# Patient Record
Sex: FEMALE | Race: BLACK OR AFRICAN AMERICAN | NOT HISPANIC OR LATINO | URBAN - METROPOLITAN AREA
[De-identification: names, ages, dates, MRNs, and addresses within clinical notes are randomized per-mention and may not be internally consistent; named-entity substitution may affect disease eponyms.]

---

## 2023-01-01 ENCOUNTER — APPOINTMENT (OUTPATIENT)
Dept: RADIOLOGY | Facility: HOSPITAL | Age: 88
DRG: 070 | End: 2023-01-01
Payer: OTHER MISCELLANEOUS

## 2023-01-01 ENCOUNTER — HOSPITAL ENCOUNTER (INPATIENT)
Facility: HOSPITAL | Age: 88
LOS: 1 days | Discharge: HOSPICE | DRG: 070 | End: 2023-10-21
Attending: EMERGENCY MEDICINE | Admitting: INTERNAL MEDICINE
Payer: OTHER MISCELLANEOUS

## 2023-01-01 ENCOUNTER — LAB (OUTPATIENT)
Dept: LAB | Facility: LAB | Age: 88
End: 2023-01-01
Payer: MEDICARE

## 2023-01-01 ENCOUNTER — APPOINTMENT (OUTPATIENT)
Dept: CARDIOLOGY | Facility: HOSPITAL | Age: 88
DRG: 070 | End: 2023-01-01
Payer: OTHER MISCELLANEOUS

## 2023-01-01 ENCOUNTER — OFFICE VISIT (OUTPATIENT)
Dept: WOUND CARE | Facility: CLINIC | Age: 88
End: 2023-01-01
Payer: MEDICARE

## 2023-01-01 ENCOUNTER — OFFICE VISIT (OUTPATIENT)
Dept: PRIMARY CARE | Facility: CLINIC | Age: 88
End: 2023-01-01
Payer: MEDICARE

## 2023-01-01 ENCOUNTER — LAB REQUISITION (OUTPATIENT)
Dept: LAB | Facility: HOSPITAL | Age: 88
End: 2023-01-01
Payer: MEDICARE

## 2023-01-01 ENCOUNTER — APPOINTMENT (OUTPATIENT)
Dept: CARDIOLOGY | Facility: CLINIC | Age: 88
End: 2023-01-01
Payer: MEDICARE

## 2023-01-01 VITALS
HEART RATE: 60 BPM | OXYGEN SATURATION: 97 % | BODY MASS INDEX: 19.08 KG/M2 | RESPIRATION RATE: 18 BRPM | HEIGHT: 64 IN | WEIGHT: 111.77 LBS | TEMPERATURE: 95.9 F | SYSTOLIC BLOOD PRESSURE: 126 MMHG | DIASTOLIC BLOOD PRESSURE: 84 MMHG

## 2023-01-01 VITALS — TEMPERATURE: 97.1 F | HEART RATE: 88 BPM

## 2023-01-01 DIAGNOSIS — F01.C18 SEVERE VASCULAR DEMENTIA WITH OTHER BEHAVIORAL DISTURBANCE (MULTI): ICD-10-CM

## 2023-01-01 DIAGNOSIS — E87.6 HYPOKALEMIA: ICD-10-CM

## 2023-01-01 DIAGNOSIS — T14.8XXA CHRONIC WOUND: ICD-10-CM

## 2023-01-01 DIAGNOSIS — E78.49 OTHER HYPERLIPIDEMIA: ICD-10-CM

## 2023-01-01 DIAGNOSIS — L89.152 PRESSURE ULCER OF SACRAL REGION, STAGE 2 (MULTI): ICD-10-CM

## 2023-01-01 DIAGNOSIS — E55.9 VITAMIN D DEFICIENCY: ICD-10-CM

## 2023-01-01 DIAGNOSIS — F32.A DEPRESSIVE DISORDER: ICD-10-CM

## 2023-01-01 DIAGNOSIS — Z28.21 INFLUENZA VACCINE REFUSED: ICD-10-CM

## 2023-01-01 DIAGNOSIS — R79.89 ELEVATED TROPONIN: ICD-10-CM

## 2023-01-01 DIAGNOSIS — Z71.85 VACCINE COUNSELING: ICD-10-CM

## 2023-01-01 DIAGNOSIS — R41.82 ALTERED MENTAL STATUS, UNSPECIFIED ALTERED MENTAL STATUS TYPE: Primary | ICD-10-CM

## 2023-01-01 DIAGNOSIS — E87.0 HYPERNATREMIA: ICD-10-CM

## 2023-01-01 DIAGNOSIS — R41.89 COGNITIVE IMPAIRMENT: ICD-10-CM

## 2023-01-01 DIAGNOSIS — I50.32 CHRONIC DIASTOLIC HEART FAILURE (MULTI): ICD-10-CM

## 2023-01-01 DIAGNOSIS — Z51.5 HOSPICE CARE: ICD-10-CM

## 2023-01-01 DIAGNOSIS — R41.89 UNRESPONSIVE STATE: ICD-10-CM

## 2023-01-01 DIAGNOSIS — I95.1 ORTHOSTATIC HYPOTENSION: ICD-10-CM

## 2023-01-01 DIAGNOSIS — E87.6 HYPOKALEMIA: Primary | ICD-10-CM

## 2023-01-01 DIAGNOSIS — I95.1 ORTHOSTATIC HYPOTENSION: Primary | ICD-10-CM

## 2023-01-01 DIAGNOSIS — L97.512 NON-PRESSURE CHRONIC ULCER OF OTHER PART OF RIGHT FOOT WITH FAT LAYER EXPOSED (MULTI): ICD-10-CM

## 2023-01-01 DIAGNOSIS — R57.9 SHOCK (MULTI): ICD-10-CM

## 2023-01-01 DIAGNOSIS — Z76.0 ENCOUNTER FOR MEDICATION REFILL: ICD-10-CM

## 2023-01-01 LAB
25(OH)D3 SERPL-MCNC: 43 NG/ML (ref 30–100)
ALANINE AMINOTRANSFERASE (SGPT) (U/L) IN SER/PLAS: 10 U/L (ref 7–45)
ALBUMIN (G/DL) IN SER/PLAS: 3.2 G/DL (ref 3.4–5)
ALBUMIN SERPL BCP-MCNC: 2.4 G/DL (ref 3.4–5)
ALBUMIN SERPL BCP-MCNC: 2.6 G/DL (ref 3.4–5)
ALBUMIN SERPL BCP-MCNC: 2.8 G/DL (ref 3.4–5)
ALKALINE PHOSPHATASE (U/L) IN SER/PLAS: 48 U/L (ref 33–136)
ALP SERPL-CCNC: 40 U/L (ref 33–136)
ALP SERPL-CCNC: 50 U/L (ref 33–136)
ALP SERPL-CCNC: 58 U/L (ref 33–136)
ALT SERPL W P-5'-P-CCNC: 44 U/L (ref 7–45)
ALT SERPL W P-5'-P-CCNC: 7 U/L (ref 7–45)
ALT SERPL W P-5'-P-CCNC: 9 U/L (ref 7–45)
ANION GAP BLDA CALCULATED.4IONS-SCNC: 3 MMO/L (ref 10–25)
ANION GAP BLDA CALCULATED.4IONS-SCNC: 4 MMO/L (ref 10–25)
ANION GAP BLDV CALCULATED.4IONS-SCNC: 3 MMOL/L (ref 10–25)
ANION GAP IN SER/PLAS: 9 MMOL/L (ref 10–20)
ANION GAP SERPL CALC-SCNC: 10 MMOL/L (ref 10–20)
ANION GAP SERPL CALC-SCNC: 11 MMOL/L (ref 10–20)
ANION GAP SERPL CALC-SCNC: 11 MMOL/L (ref 10–20)
APPARATUS: ABNORMAL
APPEARANCE UR: CLEAR
ASPARTATE AMINOTRANSFERASE (SGOT) (U/L) IN SER/PLAS: 21 U/L (ref 9–39)
AST SERPL W P-5'-P-CCNC: 17 U/L (ref 9–39)
AST SERPL W P-5'-P-CCNC: 18 U/L (ref 9–39)
AST SERPL W P-5'-P-CCNC: 54 U/L (ref 9–39)
ATRIAL RATE: 104 BPM
BACTERIA BLD CULT: NORMAL
BACTERIA BLD CULT: NORMAL
BACTERIA SPEC CULT: ABNORMAL
BACTERIA SPEC CULT: NORMAL
BASE EXCESS BLDA CALC-SCNC: 6.6 MMOL/L (ref -2–3)
BASE EXCESS BLDA CALC-SCNC: 8.7 MMOL/L (ref -2–3)
BASE EXCESS BLDV CALC-SCNC: 10.5 MMOL/L (ref -2–3)
BASOPHILS # BLD AUTO: 0.01 X10*3/UL (ref 0–0.1)
BASOPHILS # BLD AUTO: 0.02 X10*3/UL (ref 0–0.1)
BASOPHILS (10*3/UL) IN BLOOD BY AUTOMATED COUNT: 0.02 X10E9/L (ref 0–0.1)
BASOPHILS NFR BLD AUTO: 0.1 %
BASOPHILS NFR BLD AUTO: 0.3 %
BASOPHILS/100 LEUKOCYTES IN BLOOD BY AUTOMATED COUNT: 0.5 % (ref 0–2)
BILIRUB SERPL-MCNC: 0.3 MG/DL (ref 0–1.2)
BILIRUB SERPL-MCNC: 0.3 MG/DL (ref 0–1.2)
BILIRUB SERPL-MCNC: 0.4 MG/DL (ref 0–1.2)
BILIRUB UR STRIP.AUTO-MCNC: NEGATIVE MG/DL
BILIRUBIN TOTAL (MG/DL) IN SER/PLAS: 0.5 MG/DL (ref 0–1.2)
BODY TEMPERATURE: ABNORMAL
BUN SERPL-MCNC: 18 MG/DL (ref 6–23)
BUN SERPL-MCNC: 21 MG/DL (ref 6–23)
BUN SERPL-MCNC: 25 MG/DL (ref 6–23)
CA-I BLDA-SCNC: 1.03 MMOL/L (ref 1.1–1.33)
CA-I BLDA-SCNC: 1.04 MMOL/L (ref 1.1–1.33)
CA-I BLDV-SCNC: 0.99 MMOL/L (ref 1.1–1.33)
CALCIUM (MG/DL) IN SER/PLAS: 8.4 MG/DL (ref 8.6–10.3)
CALCIUM SERPL-MCNC: 7.4 MG/DL (ref 8.6–10.3)
CALCIUM SERPL-MCNC: 7.5 MG/DL (ref 8.6–10.3)
CALCIUM SERPL-MCNC: 8.2 MG/DL (ref 8.6–10.3)
CARBON DIOXIDE, TOTAL (MMOL/L) IN SER/PLAS: 35 MMOL/L (ref 21–32)
CARDIAC TROPONIN I PNL SERPL HS: 332 NG/L (ref 0–13)
CARDIAC TROPONIN I PNL SERPL HS: 346 NG/L (ref 0–13)
CHLORIDE (MMOL/L) IN SER/PLAS: 100 MMOL/L (ref 98–107)
CHLORIDE BLDA-SCNC: 111 MMOL/L (ref 98–107)
CHLORIDE BLDA-SCNC: 113 MMOL/L (ref 98–107)
CHLORIDE BLDV-SCNC: 109 MMOL/L (ref 98–107)
CHLORIDE SERPL-SCNC: 101 MMOL/L (ref 98–107)
CHLORIDE SERPL-SCNC: 107 MMOL/L (ref 98–107)
CHLORIDE SERPL-SCNC: 108 MMOL/L (ref 98–107)
CHOLEST SERPL-MCNC: 177 MG/DL (ref 0–199)
CHOLESTEROL/HDL RATIO: 5.4
CO2 SERPL-SCNC: 30 MMOL/L (ref 21–32)
CO2 SERPL-SCNC: 36 MMOL/L (ref 21–32)
CO2 SERPL-SCNC: 39 MMOL/L (ref 21–32)
COLOR UR: ABNORMAL
CREAT SERPL-MCNC: 0.66 MG/DL (ref 0.5–1.05)
CREAT SERPL-MCNC: 0.8 MG/DL (ref 0.5–1.05)
CREAT SERPL-MCNC: 0.83 MG/DL (ref 0.5–1.05)
CREATININE (MG/DL) IN SER/PLAS: 0.92 MG/DL (ref 0.5–1.05)
CRITICAL CALL TIME: 1500
CRITICAL CALL TIME: 1727
CRITICAL CALL TIME: 305
CRITICAL CALLED BY: ABNORMAL
CRITICAL CALLED TO: ABNORMAL
CRITICAL READ BACK: ABNORMAL
EOSINOPHIL # BLD AUTO: 0.01 X10*3/UL (ref 0–0.4)
EOSINOPHIL # BLD AUTO: 0.02 X10*3/UL (ref 0–0.4)
EOSINOPHIL NFR BLD AUTO: 0.1 %
EOSINOPHIL NFR BLD AUTO: 0.3 %
EOSINOPHILS (10*3/UL) IN BLOOD BY AUTOMATED COUNT: 0.01 X10E9/L (ref 0–0.4)
EOSINOPHILS/100 LEUKOCYTES IN BLOOD BY AUTOMATED COUNT: 0.2 % (ref 0–6)
ERYTHROCYTE DISTRIBUTION WIDTH (RATIO) BY AUTOMATED COUNT: 17.1 % (ref 11.5–14.5)
ERYTHROCYTE MEAN CORPUSCULAR HEMOGLOBIN CONCENTRATION (G/DL) BY AUTOMATED: 31.2 G/DL (ref 32–36)
ERYTHROCYTE MEAN CORPUSCULAR VOLUME (FL) BY AUTOMATED COUNT: 74 FL (ref 80–100)
ERYTHROCYTE [DISTWIDTH] IN BLOOD BY AUTOMATED COUNT: 15.8 % (ref 11.5–14.5)
ERYTHROCYTE [DISTWIDTH] IN BLOOD BY AUTOMATED COUNT: 15.9 % (ref 11.5–14.5)
ERYTHROCYTES (10*6/UL) IN BLOOD BY AUTOMATED COUNT: 5.28 X10E12/L (ref 4–5.2)
FLOW: 4 LPM
GFR FEMALE: 58 ML/MIN/1.73M2
GFR SERPL CREATININE-BSD FRML MDRD: 65 ML/MIN/1.73M*2
GFR SERPL CREATININE-BSD FRML MDRD: 68 ML/MIN/1.73M*2
GFR SERPL CREATININE-BSD FRML MDRD: 81 ML/MIN/1.73M*2
GLUCOSE (MG/DL) IN SER/PLAS: 125 MG/DL (ref 74–99)
GLUCOSE BLD MANUAL STRIP-MCNC: 12 MG/DL (ref 74–99)
GLUCOSE BLD MANUAL STRIP-MCNC: 156 MG/DL (ref 74–99)
GLUCOSE BLD MANUAL STRIP-MCNC: 20 MG/DL (ref 74–99)
GLUCOSE BLD MANUAL STRIP-MCNC: 47 MG/DL (ref 74–99)
GLUCOSE BLD MANUAL STRIP-MCNC: 63 MG/DL (ref 74–99)
GLUCOSE BLD MANUAL STRIP-MCNC: 88 MG/DL (ref 74–99)
GLUCOSE BLDA-MCNC: 116 MG/DL (ref 74–99)
GLUCOSE BLDA-MCNC: 350 MG/DL (ref 74–99)
GLUCOSE BLDV-MCNC: 93 MG/DL (ref 74–99)
GLUCOSE SERPL-MCNC: 103 MG/DL (ref 74–99)
GLUCOSE SERPL-MCNC: 167 MG/DL (ref 74–99)
GLUCOSE SERPL-MCNC: 98 MG/DL (ref 74–99)
GLUCOSE UR STRIP.AUTO-MCNC: ABNORMAL MG/DL
GRAM STN SPEC: ABNORMAL
GRAM STN SPEC: ABNORMAL
GRAM STN SPEC: NORMAL
GRAM STN SPEC: NORMAL
HCO3 BLDA-SCNC: 32.3 MMOL/L (ref 22–26)
HCO3 BLDA-SCNC: 34.1 MMOL/L (ref 22–26)
HCO3 BLDV-SCNC: 37.3 MMOL/L (ref 22–26)
HCT VFR BLD AUTO: 34.4 % (ref 36–46)
HCT VFR BLD AUTO: 41.2 % (ref 36–46)
HCT VFR BLD EST: 29 % (ref 36–46)
HCT VFR BLD EST: 32 % (ref 36–46)
HCT VFR BLD EST: 38 % (ref 36–46)
HDLC SERPL-MCNC: 32.5 MG/DL
HEMATOCRIT (%) IN BLOOD BY AUTOMATED COUNT: 39.1 % (ref 36–46)
HEMOGLOBIN (G/DL) IN BLOOD: 12.2 G/DL (ref 12–16)
HGB BLD-MCNC: 10.8 G/DL (ref 12–16)
HGB BLD-MCNC: 12.5 G/DL (ref 12–16)
HGB BLDA-MCNC: 10.6 G/DL (ref 12–16)
HGB BLDA-MCNC: 12.8 G/DL (ref 12–16)
HGB BLDV-MCNC: 9.5 G/DL (ref 12–16)
HOLD SPECIMEN: NORMAL
IMM GRANULOCYTES # BLD AUTO: 0.01 X10*3/UL (ref 0–0.5)
IMM GRANULOCYTES # BLD AUTO: 0.01 X10*3/UL (ref 0–0.5)
IMM GRANULOCYTES NFR BLD AUTO: 0.1 % (ref 0–0.9)
IMM GRANULOCYTES NFR BLD AUTO: 0.2 % (ref 0–0.9)
IMMATURE GRANULOCYTES/100 LEUKOCYTES IN BLOOD BY AUTOMATED COUNT: 0.2 % (ref 0–0.9)
INHALED O2 CONCENTRATION: 100 %
INHALED O2 CONCENTRATION: 100 %
INHALED O2 CONCENTRATION: 21 %
KETONES UR STRIP.AUTO-MCNC: NEGATIVE MG/DL
LACTATE BLDA-SCNC: 1.2 MMOL/L (ref 0.4–2)
LACTATE BLDA-SCNC: 1.6 MMOL/L (ref 0.4–2)
LACTATE BLDV-SCNC: 2.4 MMOL/L (ref 0.4–2)
LACTATE SERPL-SCNC: 2.4 MMOL/L (ref 0.4–2)
LACTATE SERPL-SCNC: 2.5 MMOL/L (ref 0.4–2)
LDLC SERPL CALC-MCNC: 121 MG/DL (ref 140–190)
LEUKOCYTE ESTERASE UR QL STRIP.AUTO: NEGATIVE
LEUKOCYTES (10*3/UL) IN BLOOD BY AUTOMATED COUNT: 4.1 X10E9/L (ref 4.4–11.3)
LYMPHOCYTES # BLD AUTO: 3.86 X10*3/UL (ref 0.8–3)
LYMPHOCYTES # BLD AUTO: 4.46 X10*3/UL (ref 0.8–3)
LYMPHOCYTES (10*3/UL) IN BLOOD BY AUTOMATED COUNT: 1.86 X10E9/L (ref 0.8–3)
LYMPHOCYTES NFR BLD AUTO: 60.9 %
LYMPHOCYTES NFR BLD AUTO: 65.4 %
LYMPHOCYTES/100 LEUKOCYTES IN BLOOD BY AUTOMATED COUNT: 45.3 % (ref 13–44)
MAGNESIUM SERPL-MCNC: 1.88 MG/DL (ref 1.6–2.4)
MCH RBC QN AUTO: 22.7 PG (ref 26–34)
MCH RBC QN AUTO: 23.1 PG (ref 26–34)
MCHC RBC AUTO-ENTMCNC: 30.3 G/DL (ref 32–36)
MCHC RBC AUTO-ENTMCNC: 31.4 G/DL (ref 32–36)
MCV RBC AUTO: 74 FL (ref 80–100)
MCV RBC AUTO: 75 FL (ref 80–100)
MONOCYTES # BLD AUTO: 0.29 X10*3/UL (ref 0.05–0.8)
MONOCYTES # BLD AUTO: 0.33 X10*3/UL (ref 0.05–0.8)
MONOCYTES (10*3/UL) IN BLOOD BY AUTOMATED COUNT: 0.13 X10E9/L (ref 0.05–0.8)
MONOCYTES NFR BLD AUTO: 4.6 %
MONOCYTES NFR BLD AUTO: 4.8 %
MONOCYTES/100 LEUKOCYTES IN BLOOD BY AUTOMATED COUNT: 3.2 % (ref 2–10)
NEUTROPHILS # BLD AUTO: 2 X10*3/UL (ref 1.6–5.5)
NEUTROPHILS # BLD AUTO: 2.14 X10*3/UL (ref 1.6–5.5)
NEUTROPHILS (10*3/UL) IN BLOOD BY AUTOMATED COUNT: 2.08 X10E9/L (ref 1.6–5.5)
NEUTROPHILS NFR BLD AUTO: 29.5 %
NEUTROPHILS NFR BLD AUTO: 33.7 %
NEUTROPHILS/100 LEUKOCYTES IN BLOOD BY AUTOMATED COUNT: 50.6 % (ref 40–80)
NITRITE UR QL STRIP.AUTO: NEGATIVE
NON HDL CHOLESTEROL: 145 MG/DL (ref 0–149)
NRBC (PER 100 WBCS) BY AUTOMATED COUNT: 0 /100 WBC (ref 0–0)
NRBC BLD-RTO: 0 /100 WBCS (ref 0–0)
NRBC BLD-RTO: 0 /100 WBCS (ref 0–0)
OXYHGB MFR BLDA: 97.6 % (ref 94–98)
OXYHGB MFR BLDA: 97.9 % (ref 94–98)
OXYHGB MFR BLDV: 29.4 % (ref 45–75)
PCO2 BLDA: 49 MM HG (ref 38–42)
PCO2 BLDA: 51 MM HG (ref 38–42)
PCO2 BLDV: 63 MM HG (ref 41–51)
PH BLDA: 7.41 PH (ref 7.38–7.42)
PH BLDA: 7.45 PH (ref 7.38–7.42)
PH BLDV: 7.38 PH (ref 7.33–7.43)
PH UR STRIP.AUTO: 7 [PH]
PLATELET # BLD AUTO: 207 X10*3/UL (ref 150–450)
PLATELET # BLD AUTO: 242 X10*3/UL (ref 150–450)
PLATELETS (10*3/UL) IN BLOOD AUTOMATED COUNT: 226 X10E9/L (ref 150–450)
PMV BLD AUTO: 10.5 FL (ref 7.5–11.5)
PMV BLD AUTO: 9.8 FL (ref 7.5–11.5)
PO2 BLDA: 224 MM HG (ref 85–95)
PO2 BLDA: 255 MM HG (ref 85–95)
PO2 BLDV: 26 MM HG (ref 35–45)
POTASSIUM (MMOL/L) IN SER/PLAS: 2.4 MMOL/L (ref 3.5–5.3)
POTASSIUM BLDA-SCNC: 1.7 MMOL/L (ref 3.5–5.3)
POTASSIUM BLDA-SCNC: 2.2 MMOL/L (ref 3.5–5.3)
POTASSIUM BLDV-SCNC: 2.5 MMOL/L (ref 3.5–5.3)
POTASSIUM SERPL-SCNC: 2.6 MMOL/L (ref 3.5–5.3)
POTASSIUM SERPL-SCNC: 2.7 MMOL/L (ref 3.5–5.3)
POTASSIUM SERPL-SCNC: 3.3 MMOL/L (ref 3.5–5.3)
PROT SERPL-MCNC: 4.9 G/DL (ref 6.4–8.2)
PROT SERPL-MCNC: 5.4 G/DL (ref 6.4–8.2)
PROT SERPL-MCNC: 5.7 G/DL (ref 6.4–8.2)
PROT UR STRIP.AUTO-MCNC: NEGATIVE MG/DL
PROTEIN TOTAL: 6 G/DL (ref 6.4–8.2)
Q ONSET: 224 MS
QRS COUNT: 14 BEATS
QRS DURATION: 96 MS
QT INTERVAL: 424 MS
QTC CALCULATION(BAZETT): 518 MS
QTC FREDERICIA: 485 MS
R AXIS: 20 DEGREES
RBC # BLD AUTO: 4.68 X10*6/UL (ref 4–5.2)
RBC # BLD AUTO: 5.51 X10*6/UL (ref 4–5.2)
RBC # UR STRIP.AUTO: ABNORMAL /UL
RBC #/AREA URNS AUTO: NORMAL /HPF
SAO2 % BLDA: 98 % (ref 94–100)
SAO2 % BLDA: 98 % (ref 94–100)
SAO2 % BLDV: 30 % (ref 45–75)
SEDIMENTATION RATE, ERYTHROCYTE: 6 MM/H (ref 0–30)
SODIUM (MMOL/L) IN SER/PLAS: 142 MMOL/L (ref 136–145)
SODIUM BLDA-SCNC: 146 MMOL/L (ref 136–145)
SODIUM BLDA-SCNC: 148 MMOL/L (ref 136–145)
SODIUM BLDV-SCNC: 147 MMOL/L (ref 136–145)
SODIUM SERPL-SCNC: 146 MMOL/L (ref 136–145)
SODIUM SERPL-SCNC: 148 MMOL/L (ref 136–145)
SODIUM SERPL-SCNC: 150 MMOL/L (ref 136–145)
SP GR UR STRIP.AUTO: 1
T AXIS: 0 DEGREES
T OFFSET: 436 MS
T4 FREE SERPL-MCNC: 1.07 NG/DL (ref 0.61–1.12)
TRIGL SERPL-MCNC: 119 MG/DL (ref 0–149)
TSH SERPL-ACNC: 8.98 MIU/L (ref 0.44–3.98)
UREA NITROGEN (MG/DL) IN SER/PLAS: 20 MG/DL (ref 6–23)
UROBILINOGEN UR STRIP.AUTO-MCNC: <2 MG/DL
VENTRICULAR RATE: 90 BPM
VLDL: 24 MG/DL (ref 0–40)
WBC # BLD AUTO: 6.3 X10*3/UL (ref 4.4–11.3)
WBC # BLD AUTO: 6.8 X10*3/UL (ref 4.4–11.3)
WBC #/AREA URNS AUTO: NORMAL /HPF

## 2023-01-01 PROCEDURE — 1159F MED LIST DOCD IN RCRD: CPT | Performed by: PLASTIC SURGERY

## 2023-01-01 PROCEDURE — 80053 COMPREHEN METABOLIC PANEL: CPT | Performed by: EMERGENCY MEDICINE

## 2023-01-01 PROCEDURE — 96366 THER/PROPH/DIAG IV INF ADDON: CPT

## 2023-01-01 PROCEDURE — 85025 COMPLETE CBC W/AUTO DIFF WBC: CPT | Performed by: EMERGENCY MEDICINE

## 2023-01-01 PROCEDURE — 11043 DBRDMT MUSC&/FSCA 1ST 20/<: CPT

## 2023-01-01 PROCEDURE — 99285 EMERGENCY DEPT VISIT HI MDM: CPT | Mod: 25 | Performed by: EMERGENCY MEDICINE

## 2023-01-01 PROCEDURE — 99211 OFF/OP EST MAY X REQ PHY/QHP: CPT

## 2023-01-01 PROCEDURE — 87070 CULTURE OTHR SPECIMN AEROBIC: CPT

## 2023-01-01 PROCEDURE — 2500000005 HC RX 250 GENERAL PHARMACY W/O HCPCS

## 2023-01-01 PROCEDURE — 84484 ASSAY OF TROPONIN QUANT: CPT

## 2023-01-01 PROCEDURE — 71045 X-RAY EXAM CHEST 1 VIEW: CPT

## 2023-01-01 PROCEDURE — 93010 ELECTROCARDIOGRAM REPORT: CPT | Performed by: INTERNAL MEDICINE

## 2023-01-01 PROCEDURE — 82947 ASSAY GLUCOSE BLOOD QUANT: CPT

## 2023-01-01 PROCEDURE — 70450 CT HEAD/BRAIN W/O DYE: CPT | Performed by: RADIOLOGY

## 2023-01-01 PROCEDURE — 2500000004 HC RX 250 GENERAL PHARMACY W/ HCPCS (ALT 636 FOR OP/ED)

## 2023-01-01 PROCEDURE — 99239 HOSP IP/OBS DSCHRG MGMT >30: CPT | Performed by: STUDENT IN AN ORGANIZED HEALTH CARE EDUCATION/TRAINING PROGRAM

## 2023-01-01 PROCEDURE — 80061 LIPID PANEL: CPT

## 2023-01-01 PROCEDURE — 99285 EMERGENCY DEPT VISIT HI MDM: CPT | Performed by: EMERGENCY MEDICINE

## 2023-01-01 PROCEDURE — 1126F AMNT PAIN NOTED NONE PRSNT: CPT | Performed by: PLASTIC SURGERY

## 2023-01-01 PROCEDURE — 99214 OFFICE O/P EST MOD 30 MIN: CPT | Performed by: INTERNAL MEDICINE

## 2023-01-01 PROCEDURE — 2500000001 HC RX 250 WO HCPCS SELF ADMINISTERED DRUGS (ALT 637 FOR MEDICARE OP): Performed by: STUDENT IN AN ORGANIZED HEALTH CARE EDUCATION/TRAINING PROGRAM

## 2023-01-01 PROCEDURE — 1159F MED LIST DOCD IN RCRD: CPT | Performed by: INTERNAL MEDICINE

## 2023-01-01 PROCEDURE — 84132 ASSAY OF SERUM POTASSIUM: CPT | Performed by: INTERNAL MEDICINE

## 2023-01-01 PROCEDURE — 2500000004 HC RX 250 GENERAL PHARMACY W/ HCPCS (ALT 636 FOR OP/ED): Performed by: INTERNAL MEDICINE

## 2023-01-01 PROCEDURE — 1036F TOBACCO NON-USER: CPT | Performed by: PLASTIC SURGERY

## 2023-01-01 PROCEDURE — 2500000004 HC RX 250 GENERAL PHARMACY W/ HCPCS (ALT 636 FOR OP/ED): Performed by: EMERGENCY MEDICINE

## 2023-01-01 PROCEDURE — 11043 DBRDMT MUSC&/FSCA 1ST 20/<: CPT | Performed by: PLASTIC SURGERY

## 2023-01-01 PROCEDURE — 99213 OFFICE O/P EST LOW 20 MIN: CPT | Mod: 25

## 2023-01-01 PROCEDURE — 87040 BLOOD CULTURE FOR BACTERIA: CPT | Performed by: EMERGENCY MEDICINE

## 2023-01-01 PROCEDURE — 82306 VITAMIN D 25 HYDROXY: CPT

## 2023-01-01 PROCEDURE — 36415 COLL VENOUS BLD VENIPUNCTURE: CPT | Performed by: EMERGENCY MEDICINE

## 2023-01-01 PROCEDURE — 36415 COLL VENOUS BLD VENIPUNCTURE: CPT | Performed by: INTERNAL MEDICINE

## 2023-01-01 PROCEDURE — 96365 THER/PROPH/DIAG IV INF INIT: CPT | Mod: 59

## 2023-01-01 PROCEDURE — 96376 TX/PRO/DX INJ SAME DRUG ADON: CPT

## 2023-01-01 PROCEDURE — 84484 ASSAY OF TROPONIN QUANT: CPT | Performed by: EMERGENCY MEDICINE

## 2023-01-01 PROCEDURE — 96374 THER/PROPH/DIAG INJ IV PUSH: CPT

## 2023-01-01 PROCEDURE — 99211 OFF/OP EST MAY X REQ PHY/QHP: CPT | Performed by: PLASTIC SURGERY

## 2023-01-01 PROCEDURE — 36600 WITHDRAWAL OF ARTERIAL BLOOD: CPT

## 2023-01-01 PROCEDURE — 80053 COMPREHEN METABOLIC PANEL: CPT

## 2023-01-01 PROCEDURE — 82947 ASSAY GLUCOSE BLOOD QUANT: CPT | Performed by: PLASTIC SURGERY

## 2023-01-01 PROCEDURE — 99223 1ST HOSP IP/OBS HIGH 75: CPT | Performed by: INTERNAL MEDICINE

## 2023-01-01 PROCEDURE — 87075 CULTR BACTERIA EXCEPT BLOOD: CPT

## 2023-01-01 PROCEDURE — 87040 BLOOD CULTURE FOR BACTERIA: CPT | Mod: STJLAB

## 2023-01-01 PROCEDURE — 83605 ASSAY OF LACTIC ACID: CPT | Performed by: EMERGENCY MEDICINE

## 2023-01-01 PROCEDURE — 11042 DBRDMT SUBQ TIS 1ST 20SQCM/<: CPT | Performed by: PLASTIC SURGERY

## 2023-01-01 PROCEDURE — 96361 HYDRATE IV INFUSION ADD-ON: CPT

## 2023-01-01 PROCEDURE — 11042 DBRDMT SUBQ TIS 1ST 20SQCM/<: CPT | Mod: 59

## 2023-01-01 PROCEDURE — 83735 ASSAY OF MAGNESIUM: CPT

## 2023-01-01 PROCEDURE — 70450 CT HEAD/BRAIN W/O DYE: CPT | Mod: MG

## 2023-01-01 PROCEDURE — 84132 ASSAY OF SERUM POTASSIUM: CPT

## 2023-01-01 PROCEDURE — 96375 TX/PRO/DX INJ NEW DRUG ADDON: CPT

## 2023-01-01 PROCEDURE — 71045 X-RAY EXAM CHEST 1 VIEW: CPT | Performed by: RADIOLOGY

## 2023-01-01 PROCEDURE — 84443 ASSAY THYROID STIM HORMONE: CPT

## 2023-01-01 PROCEDURE — 99222 1ST HOSP IP/OBS MODERATE 55: CPT | Performed by: PSYCHIATRY & NEUROLOGY

## 2023-01-01 PROCEDURE — 1160F RVW MEDS BY RX/DR IN RCRD: CPT | Performed by: INTERNAL MEDICINE

## 2023-01-01 PROCEDURE — 1036F TOBACCO NON-USER: CPT | Performed by: INTERNAL MEDICINE

## 2023-01-01 PROCEDURE — 81001 URINALYSIS AUTO W/SCOPE: CPT | Performed by: EMERGENCY MEDICINE

## 2023-01-01 PROCEDURE — 92610 EVALUATE SWALLOWING FUNCTION: CPT | Mod: GN | Performed by: SPEECH-LANGUAGE PATHOLOGIST

## 2023-01-01 PROCEDURE — 93005 ELECTROCARDIOGRAM TRACING: CPT

## 2023-01-01 PROCEDURE — 84439 ASSAY OF FREE THYROXINE: CPT

## 2023-01-01 PROCEDURE — 84132 ASSAY OF SERUM POTASSIUM: CPT | Performed by: EMERGENCY MEDICINE

## 2023-01-01 PROCEDURE — 1100000001 HC PRIVATE ROOM DAILY

## 2023-01-01 PROCEDURE — 1126F AMNT PAIN NOTED NONE PRSNT: CPT | Performed by: INTERNAL MEDICINE

## 2023-01-01 PROCEDURE — 99213 OFFICE O/P EST LOW 20 MIN: CPT | Performed by: PLASTIC SURGERY

## 2023-01-01 PROCEDURE — 36415 COLL VENOUS BLD VENIPUNCTURE: CPT

## 2023-01-01 PROCEDURE — 1160F RVW MEDS BY RX/DR IN RCRD: CPT | Performed by: PLASTIC SURGERY

## 2023-01-01 PROCEDURE — 85025 COMPLETE CBC W/AUTO DIFF WBC: CPT

## 2023-01-01 PROCEDURE — 2500000005 HC RX 250 GENERAL PHARMACY W/O HCPCS: Performed by: EMERGENCY MEDICINE

## 2023-01-01 PROCEDURE — 87040 BLOOD CULTURE FOR BACTERIA: CPT | Mod: CMCLAB | Performed by: EMERGENCY MEDICINE

## 2023-01-01 RX ORDER — POLYETHYLENE GLYCOL 3350 17 G/17G
17 POWDER, FOR SOLUTION ORAL DAILY PRN
Status: DISCONTINUED | OUTPATIENT
Start: 2023-01-01 | End: 2023-01-01 | Stop reason: HOSPADM

## 2023-01-01 RX ORDER — POTASSIUM CHLORIDE 14.9 MG/ML
20 INJECTION INTRAVENOUS
Status: DISCONTINUED | OUTPATIENT
Start: 2023-01-01 | End: 2023-01-01

## 2023-01-01 RX ORDER — MULTIVIT-MIN/FA/LYCOPEN/LUTEIN .4-300-25
TABLET ORAL
COMMUNITY
End: 2023-01-01 | Stop reason: ENTERED-IN-ERROR

## 2023-01-01 RX ORDER — ASPIRIN 81 MG/1
1 TABLET ORAL DAILY
COMMUNITY

## 2023-01-01 RX ORDER — MORPHINE SULFATE 2 MG/ML
2 INJECTION, SOLUTION INTRAMUSCULAR; INTRAVENOUS
Status: CANCELLED | OUTPATIENT
Start: 2023-01-01

## 2023-01-01 RX ORDER — LORAZEPAM 2 MG/ML
0.5 INJECTION INTRAMUSCULAR EVERY 4 HOURS PRN
Status: CANCELLED | OUTPATIENT
Start: 2023-01-01

## 2023-01-01 RX ORDER — DEXTROSE 50 % IN WATER (D50W) INTRAVENOUS SYRINGE
25 ONCE
Status: COMPLETED | OUTPATIENT
Start: 2023-01-01 | End: 2023-01-01

## 2023-01-01 RX ORDER — LEVETIRACETAM 5 MG/ML
500 INJECTION INTRAVASCULAR EVERY 12 HOURS
Status: DISCONTINUED | OUTPATIENT
Start: 2023-01-01 | End: 2023-01-01

## 2023-01-01 RX ORDER — LORAZEPAM 2 MG/ML
0.5 INJECTION INTRAMUSCULAR ONCE
Status: COMPLETED | OUTPATIENT
Start: 2023-01-01 | End: 2023-01-01

## 2023-01-01 RX ORDER — DEXTROSE 50 % IN WATER (D50W) INTRAVENOUS SYRINGE
Status: COMPLETED
Start: 2023-01-01 | End: 2023-01-01

## 2023-01-01 RX ORDER — HALOPERIDOL 5 MG/ML
1 INJECTION INTRAMUSCULAR EVERY 4 HOURS PRN
Status: CANCELLED | OUTPATIENT
Start: 2023-01-01

## 2023-01-01 RX ORDER — ACETAMINOPHEN 500 MG
1 TABLET ORAL DAILY
COMMUNITY
Start: 2021-10-28

## 2023-01-01 RX ORDER — ACETAMINOPHEN 650 MG/1
650 SUPPOSITORY RECTAL EVERY 4 HOURS PRN
Status: CANCELLED | OUTPATIENT
Start: 2023-01-01

## 2023-01-01 RX ORDER — ONDANSETRON 4 MG/1
4 TABLET, ORALLY DISINTEGRATING ORAL EVERY 8 HOURS PRN
Status: DISCONTINUED | OUTPATIENT
Start: 2023-01-01 | End: 2023-01-01 | Stop reason: HOSPADM

## 2023-01-01 RX ORDER — LATANOPROST 50 UG/ML
1 SOLUTION/ DROPS OPHTHALMIC NIGHTLY
Status: DISCONTINUED | OUTPATIENT
Start: 2023-01-01 | End: 2023-01-01 | Stop reason: HOSPADM

## 2023-01-01 RX ORDER — TALC
3 POWDER (GRAM) TOPICAL NIGHTLY PRN
Status: DISCONTINUED | OUTPATIENT
Start: 2023-01-01 | End: 2023-01-01 | Stop reason: HOSPADM

## 2023-01-01 RX ORDER — MEMANTINE HYDROCHLORIDE 10 MG/1
10 TABLET ORAL 2 TIMES DAILY
Status: DISCONTINUED | OUTPATIENT
Start: 2023-01-01 | End: 2023-01-01 | Stop reason: HOSPADM

## 2023-01-01 RX ORDER — LORAZEPAM 2 MG/ML
INJECTION INTRAMUSCULAR
Status: COMPLETED
Start: 2023-01-01 | End: 2023-01-01

## 2023-01-01 RX ORDER — POTASSIUM CHLORIDE 1.5 G/1
POWDER, FOR SOLUTION ORAL
Qty: 180 PACKET | Refills: 1 | Status: SHIPPED | OUTPATIENT
Start: 2023-01-01 | End: 2024-02-10

## 2023-01-01 RX ORDER — NOREPINEPHRINE BITARTRATE/D5W 8 MG/250ML
PLASTIC BAG, INJECTION (ML) INTRAVENOUS
Status: DISPENSED
Start: 2023-01-01 | End: 2023-01-01

## 2023-01-01 RX ORDER — GINSENG 100 MG
200 CAPSULE ORAL DAILY
Status: DISCONTINUED | OUTPATIENT
Start: 2023-01-01 | End: 2023-01-01 | Stop reason: WASHOUT

## 2023-01-01 RX ORDER — NOREPINEPHRINE BITARTRATE/D5W 8 MG/250ML
.01-.5 PLASTIC BAG, INJECTION (ML) INTRAVENOUS CONTINUOUS
Status: DISCONTINUED | OUTPATIENT
Start: 2023-01-01 | End: 2023-01-01

## 2023-01-01 RX ORDER — MIDODRINE HYDROCHLORIDE 5 MG/1
2.5 TABLET ORAL 3 TIMES DAILY
Status: DISCONTINUED | OUTPATIENT
Start: 2023-01-01 | End: 2023-01-01 | Stop reason: HOSPADM

## 2023-01-01 RX ORDER — FLUDROCORTISONE ACETATE 0.1 MG/1
0.1 TABLET ORAL DAILY
Qty: 30 TABLET | Refills: 1 | Status: SHIPPED | OUTPATIENT
Start: 2023-01-01 | End: 2023-01-01

## 2023-01-01 RX ORDER — MIDODRINE HYDROCHLORIDE 2.5 MG/1
1 TABLET ORAL 3 TIMES DAILY
COMMUNITY
Start: 2021-06-28 | End: 2023-01-01

## 2023-01-01 RX ORDER — ACETAMINOPHEN 650 MG/1
650 SUPPOSITORY RECTAL EVERY 4 HOURS PRN
Status: DISCONTINUED | OUTPATIENT
Start: 2023-01-01 | End: 2023-01-01 | Stop reason: HOSPADM

## 2023-01-01 RX ORDER — PHENYLEPHRINE HCL IN 0.9% NACL 1 MG/10 ML
100 SYRINGE (ML) INTRAVENOUS EVERY 10 MIN PRN
Status: DISCONTINUED | OUTPATIENT
Start: 2023-01-01 | End: 2023-01-01

## 2023-01-01 RX ORDER — ACETAMINOPHEN 325 MG/1
650 TABLET ORAL EVERY 4 HOURS PRN
Status: DISCONTINUED | OUTPATIENT
Start: 2023-01-01 | End: 2023-01-01 | Stop reason: HOSPADM

## 2023-01-01 RX ORDER — ACETAMINOPHEN 160 MG/5ML
650 SOLUTION ORAL EVERY 4 HOURS PRN
Status: DISCONTINUED | OUTPATIENT
Start: 2023-01-01 | End: 2023-01-01 | Stop reason: HOSPADM

## 2023-01-01 RX ORDER — FLUDROCORTISONE ACETATE 0.1 MG/1
0.1 TABLET ORAL DAILY
Status: DISCONTINUED | OUTPATIENT
Start: 2023-01-01 | End: 2023-01-01 | Stop reason: HOSPADM

## 2023-01-01 RX ORDER — POTASSIUM CHLORIDE 1.5 G/1.58G
20 POWDER, FOR SOLUTION ORAL DAILY
Qty: 14 PACKET | Refills: 0 | Status: ON HOLD | OUTPATIENT
Start: 2023-01-01 | End: 2023-01-01 | Stop reason: SDUPTHER

## 2023-01-01 RX ORDER — GENTAMICIN SULFATE 1 MG/G
1 CREAM TOPICAL DAILY PRN
Status: DISCONTINUED | OUTPATIENT
Start: 2023-01-01 | End: 2023-01-01 | Stop reason: HOSPADM

## 2023-01-01 RX ORDER — PANTOPRAZOLE SODIUM 40 MG/10ML
40 INJECTION, POWDER, LYOPHILIZED, FOR SOLUTION INTRAVENOUS
Status: DISCONTINUED | OUTPATIENT
Start: 2023-01-01 | End: 2023-01-01 | Stop reason: HOSPADM

## 2023-01-01 RX ORDER — METOCLOPRAMIDE HYDROCHLORIDE 5 MG/ML
10 INJECTION INTRAMUSCULAR; INTRAVENOUS EVERY 6 HOURS PRN
Status: DISCONTINUED | OUTPATIENT
Start: 2023-01-01 | End: 2023-01-01 | Stop reason: HOSPADM

## 2023-01-01 RX ORDER — GENTAMICIN SULFATE 1 MG/G
1 CREAM TOPICAL DAILY PRN
COMMUNITY

## 2023-01-01 RX ORDER — DEXTROSE MONOHYDRATE 100 MG/ML
100 INJECTION, SOLUTION INTRAVENOUS ONCE
Status: DISCONTINUED | OUTPATIENT
Start: 2023-01-01 | End: 2023-01-01

## 2023-01-01 RX ORDER — LEVETIRACETAM 10 MG/ML
1000 INJECTION INTRAVASCULAR ONCE
Status: COMPLETED | OUTPATIENT
Start: 2023-01-01 | End: 2023-01-01

## 2023-01-01 RX ORDER — POTASSIUM CHLORIDE 14.9 MG/ML
20 INJECTION INTRAVENOUS
Status: DISPENSED | OUTPATIENT
Start: 2023-01-01 | End: 2023-01-01

## 2023-01-01 RX ORDER — DOXYCYCLINE 100 MG/1
CAPSULE ORAL EVERY 12 HOURS
COMMUNITY
Start: 2022-10-17 | End: 2023-01-01 | Stop reason: ALTCHOICE

## 2023-01-01 RX ORDER — METOCLOPRAMIDE 10 MG/1
10 TABLET ORAL EVERY 6 HOURS PRN
Status: DISCONTINUED | OUTPATIENT
Start: 2023-01-01 | End: 2023-01-01 | Stop reason: HOSPADM

## 2023-01-01 RX ORDER — GLYCOPYRROLATE 0.2 MG/ML
0.2 INJECTION INTRAMUSCULAR; INTRAVENOUS EVERY 4 HOURS PRN
Status: CANCELLED | OUTPATIENT
Start: 2023-01-01

## 2023-01-01 RX ORDER — POTASSIUM CHLORIDE 1.5 G/1.58G
20 POWDER, FOR SOLUTION ORAL DAILY
Status: DISCONTINUED | OUTPATIENT
Start: 2023-01-01 | End: 2023-01-01 | Stop reason: HOSPADM

## 2023-01-01 RX ORDER — PHENYLEPHRINE HCL IN 0.9% NACL 1 MG/10 ML
SYRINGE (ML) INTRAVENOUS
Status: COMPLETED
Start: 2023-01-01 | End: 2023-01-01

## 2023-01-01 RX ORDER — SERTRALINE HYDROCHLORIDE 50 MG/1
1.5 TABLET, FILM COATED ORAL DAILY
COMMUNITY
Start: 2020-10-14 | End: 2023-01-01 | Stop reason: ENTERED-IN-ERROR

## 2023-01-01 RX ORDER — ONDANSETRON HYDROCHLORIDE 2 MG/ML
4 INJECTION, SOLUTION INTRAVENOUS EVERY 8 HOURS PRN
Status: DISCONTINUED | OUTPATIENT
Start: 2023-01-01 | End: 2023-01-01 | Stop reason: HOSPADM

## 2023-01-01 RX ORDER — FLUDROCORTISONE ACETATE 0.1 MG/1
1 TABLET ORAL DAILY
COMMUNITY
End: 2023-01-01 | Stop reason: SDUPTHER

## 2023-01-01 RX ORDER — PANTOPRAZOLE SODIUM 40 MG/1
40 TABLET, DELAYED RELEASE ORAL
Status: DISCONTINUED | OUTPATIENT
Start: 2023-01-01 | End: 2023-01-01 | Stop reason: HOSPADM

## 2023-01-01 RX ORDER — POTASSIUM CHLORIDE 1.5 G/1.58G
40 POWDER, FOR SOLUTION ORAL DAILY
Qty: 60 PACKET | Refills: 0 | Status: SHIPPED | OUTPATIENT
Start: 2023-01-01 | End: 2023-01-01

## 2023-01-01 RX ORDER — ACETAMINOPHEN 500 MG
5000 TABLET ORAL DAILY
Status: DISCONTINUED | OUTPATIENT
Start: 2023-01-01 | End: 2023-01-01 | Stop reason: HOSPADM

## 2023-01-01 RX ORDER — ASPIRIN 81 MG/1
81 TABLET ORAL DAILY
Status: DISCONTINUED | OUTPATIENT
Start: 2023-01-01 | End: 2023-01-01 | Stop reason: HOSPADM

## 2023-01-01 RX ORDER — DEXTROSE MONOHYDRATE 100 MG/ML
100 INJECTION, SOLUTION INTRAVENOUS CONTINUOUS
Status: DISCONTINUED | OUTPATIENT
Start: 2023-01-01 | End: 2023-01-01

## 2023-01-01 RX ORDER — MORPHINE SULFATE 4 MG/ML
4 INJECTION, SOLUTION INTRAMUSCULAR; INTRAVENOUS
Status: CANCELLED | OUTPATIENT
Start: 2023-01-01

## 2023-01-01 RX ORDER — PHENYLEPHRINE HCL IN 0.9% NACL 1 MG/10 ML
400 SYRINGE (ML) INTRAVENOUS ONCE
Status: COMPLETED | OUTPATIENT
Start: 2023-01-01 | End: 2023-01-01

## 2023-01-01 RX ORDER — GINSENG 100 MG
200 CAPSULE ORAL DAILY
COMMUNITY

## 2023-01-01 RX ORDER — MEMANTINE HYDROCHLORIDE 10 MG/1
1 TABLET ORAL 2 TIMES DAILY
COMMUNITY
Start: 2021-12-17

## 2023-01-01 RX ORDER — PHENYLEPHRINE HCL IN 0.9% NACL 1 MG/10 ML
100 SYRINGE (ML) INTRAVENOUS ONCE
Status: COMPLETED | OUTPATIENT
Start: 2023-01-01 | End: 2023-01-01

## 2023-01-01 RX ORDER — LATANOPROST 50 UG/ML
1 SOLUTION/ DROPS OPHTHALMIC NIGHTLY
COMMUNITY
Start: 2020-12-16

## 2023-01-01 RX ORDER — KETOROLAC TROMETHAMINE 15 MG/ML
15 INJECTION, SOLUTION INTRAMUSCULAR; INTRAVENOUS EVERY 6 HOURS PRN
Status: CANCELLED | OUTPATIENT
Start: 2023-01-01 | End: 2023-01-01

## 2023-01-01 RX ADMIN — MIDODRINE HYDROCHLORIDE 2.5 MG: 5 TABLET ORAL at 17:32

## 2023-01-01 RX ADMIN — POTASSIUM CHLORIDE 20 MEQ: 14.9 INJECTION, SOLUTION INTRAVENOUS at 17:33

## 2023-01-01 RX ADMIN — DEXTROSE 50 % IN WATER (D50W) INTRAVENOUS SYRINGE 25 G: at 16:05

## 2023-01-01 RX ADMIN — SODIUM CHLORIDE 1000 ML: 9 INJECTION, SOLUTION INTRAVENOUS at 15:00

## 2023-01-01 RX ADMIN — LORAZEPAM 0.5 MG: 2 INJECTION INTRAMUSCULAR; INTRAVENOUS at 15:18

## 2023-01-01 RX ADMIN — ASPIRIN 81 MG: 81 TABLET, COATED ORAL at 09:38

## 2023-01-01 RX ADMIN — POTASSIUM CHLORIDE 20 MEQ: 14.9 INJECTION, SOLUTION INTRAVENOUS at 03:13

## 2023-01-01 RX ADMIN — LORAZEPAM 0.5 MG: 2 INJECTION INTRAMUSCULAR at 15:18

## 2023-01-01 RX ADMIN — MEMANTINE 10 MG: 10 TABLET ORAL at 09:38

## 2023-01-01 RX ADMIN — DEXTROSE MONOHYDRATE 25 G: 500 INJECTION PARENTERAL at 16:05

## 2023-01-01 RX ADMIN — Medication 100 MCG: at 17:35

## 2023-01-01 RX ADMIN — POTASSIUM CHLORIDE 20 MEQ: 1.5 POWDER, FOR SOLUTION ORAL at 17:31

## 2023-01-01 RX ADMIN — FLUDROCORTISONE ACETATE 0.1 MG: 0.1 TABLET ORAL at 09:38

## 2023-01-01 RX ADMIN — CHOLECALCIFEROL TAB 125 MCG (5000 UNIT) 5000 UNITS: 125 TAB at 09:38

## 2023-01-01 RX ADMIN — MIDODRINE HYDROCHLORIDE 2.5 MG: 5 TABLET ORAL at 22:12

## 2023-01-01 RX ADMIN — SODIUM CHLORIDE 1000 ML: 9 INJECTION, SOLUTION INTRAVENOUS at 15:20

## 2023-01-01 RX ADMIN — LEVETIRACETAM 1000 MG: 10 INJECTION INTRAVENOUS at 16:10

## 2023-01-01 RX ADMIN — Medication 400 MCG: at 15:06

## 2023-01-01 RX ADMIN — MEMANTINE 10 MG: 10 TABLET ORAL at 22:13

## 2023-01-01 RX ADMIN — DEXTROSE MONOHYDRATE 100 ML/HR: 100 INJECTION, SOLUTION INTRAVENOUS at 16:50

## 2023-01-01 RX ADMIN — SODIUM CHLORIDE 1386 ML: 9 INJECTION, SOLUTION INTRAVENOUS at 15:25

## 2023-01-01 RX ADMIN — ASPIRIN 81 MG: 81 TABLET, COATED ORAL at 17:32

## 2023-01-01 RX ADMIN — MIDODRINE HYDROCHLORIDE 2.5 MG: 5 TABLET ORAL at 09:38

## 2023-01-01 RX ADMIN — LORAZEPAM 0.5 MG: 2 INJECTION INTRAMUSCULAR; INTRAVENOUS at 15:45

## 2023-01-01 RX ADMIN — SODIUM CHLORIDE 1000 ML: 9 INJECTION, SOLUTION INTRAVENOUS at 15:45

## 2023-01-01 RX ADMIN — LATANOPROST 1 DROP: 50 SOLUTION OPHTHALMIC at 22:12

## 2023-01-01 RX ADMIN — POTASSIUM CHLORIDE 20 MEQ: 1.5 POWDER, FOR SOLUTION ORAL at 09:38

## 2023-01-01 RX ADMIN — DEXTROSE MONOHYDRATE 25 G: 500 INJECTION PARENTERAL at 15:20

## 2023-01-01 RX ADMIN — CHOLECALCIFEROL TAB 125 MCG (5000 UNIT) 5000 UNITS: 125 TAB at 17:32

## 2023-01-01 RX ADMIN — FLUDROCORTISONE ACETATE 0.1 MG: 0.1 TABLET ORAL at 17:32

## 2023-01-01 RX ADMIN — DEXTROSE 50 % IN WATER (D50W) INTRAVENOUS SYRINGE 25 G: at 15:20

## 2023-01-01 SDOH — SOCIAL STABILITY: SOCIAL INSECURITY: HAS ANYONE EVER THREATENED TO HURT YOUR FAMILY OR YOUR PETS?: UNABLE TO ASSESS

## 2023-01-01 SDOH — SOCIAL STABILITY: SOCIAL INSECURITY: HAVE YOU HAD THOUGHTS OF HARMING ANYONE ELSE?: UNABLE TO ASSESS

## 2023-01-01 SDOH — SOCIAL STABILITY: SOCIAL INSECURITY: ARE THERE ANY APPARENT SIGNS OF INJURIES/BEHAVIORS THAT COULD BE RELATED TO ABUSE/NEGLECT?: UNABLE TO ASSESS

## 2023-01-01 SDOH — SOCIAL STABILITY: SOCIAL INSECURITY: ABUSE: ADULT

## 2023-01-01 SDOH — SOCIAL STABILITY: SOCIAL INSECURITY: DO YOU FEEL ANYONE HAS EXPLOITED OR TAKEN ADVANTAGE OF YOU FINANCIALLY OR OF YOUR PERSONAL PROPERTY?: UNABLE TO ASSESS

## 2023-01-01 SDOH — SOCIAL STABILITY: SOCIAL INSECURITY: DO YOU FEEL UNSAFE GOING BACK TO THE PLACE WHERE YOU ARE LIVING?: UNABLE TO ASSESS

## 2023-01-01 SDOH — SOCIAL STABILITY: SOCIAL INSECURITY: DOES ANYONE TRY TO KEEP YOU FROM HAVING/CONTACTING OTHER FRIENDS OR DOING THINGS OUTSIDE YOUR HOME?: UNABLE TO ASSESS

## 2023-01-01 SDOH — SOCIAL STABILITY: SOCIAL INSECURITY: WERE YOU ABLE TO COMPLETE ALL THE BEHAVIORAL HEALTH SCREENINGS?: NO

## 2023-01-01 SDOH — SOCIAL STABILITY: SOCIAL INSECURITY: ARE YOU OR HAVE YOU BEEN THREATENED OR ABUSED PHYSICALLY, EMOTIONALLY, OR SEXUALLY BY ANYONE?: UNABLE TO ASSESS

## 2023-01-01 ASSESSMENT — PAIN SCALES - GENERAL
PAINLEVEL_OUTOF10: 0 - NO PAIN

## 2023-01-01 ASSESSMENT — PAIN SCALES - PAIN ASSESSMENT IN ADVANCED DEMENTIA (PAINAD)
BODYLANGUAGE: RELAXED
FACIALEXPRESSION: SMILING OR INEXPRESSIVE
BREATHING: NORMAL
BODYLANGUAGE: RELAXED
TOTALSCORE: 0
BREATHING: NORMAL
BREATHING: SMILING OR INEXPRESSIVE
BODYLANGUAGE: RELAXED
TOTALSCORE: NO NEED TO CONSOLE
CONSOLABILITY: NO NEED TO CONSOLE
TOTALSCORE: 0
BREATHING: NORMAL
TOTALSCORE: 0
CONSOLABILITY: NO NEED TO CONSOLE
FACIALEXPRESSION: SMILING OR INEXPRESSIVE

## 2023-01-01 ASSESSMENT — LIFESTYLE VARIABLES
REASON UNABLE TO ASSESS: NO
HOW OFTEN DO YOU HAVE A DRINK CONTAINING ALCOHOL: NEVER
SKIP TO QUESTIONS 9-10: 1
AUDIT-C TOTAL SCORE: 0
REASON UNABLE TO ASSESS: YES
SUBSTANCE_ABUSE_PAST_12_MONTHS: NO
PRESCIPTION_ABUSE_PAST_12_MONTHS: NO
REASON UNABLE TO ASSESS: YES
HOW OFTEN DO YOU HAVE 6 OR MORE DRINKS ON ONE OCCASION: NEVER
HOW MANY STANDARD DRINKS CONTAINING ALCOHOL DO YOU HAVE ON A TYPICAL DAY: PATIENT DOES NOT DRINK
AUDIT-C TOTAL SCORE: 0

## 2023-01-01 ASSESSMENT — ACTIVITIES OF DAILY LIVING (ADL)
DRESSING YOURSELF: DEPENDENT
ADEQUATE_TO_COMPLETE_ADL: UNABLE TO ASSESS
TOILETING: UNABLE TO ASSESS
PATIENT'S MEMORY ADEQUATE TO SAFELY COMPLETE DAILY ACTIVITIES?: UNABLE TO ASSESS
HEARING - LEFT EAR: UNABLE TO ASSESS
PATIENT'S MEMORY ADEQUATE TO SAFELY COMPLETE DAILY ACTIVITIES?: UNABLE TO ASSESS
GROOMING: UNABLE TO ASSESS
FEEDING YOURSELF: DEPENDENT
HEARING - RIGHT EAR: UNABLE TO ASSESS
ADEQUATE_TO_COMPLETE_ADL: UNABLE TO ASSESS
TOILETING: DEPENDENT
HEARING - LEFT EAR: UNABLE TO ASSESS
ASSISTIVE_DEVICE: WHEELCHAIR;COMMODE
BATHING: DEPENDENT
WALKS IN HOME: UNABLE TO ASSESS
DRESSING YOURSELF: UNABLE TO ASSESS
GROOMING: DEPENDENT
ADEQUATE_TO_COMPLETE_ADL: UNABLE TO ASSESS
PATIENT'S MEMORY ADEQUATE TO SAFELY COMPLETE DAILY ACTIVITIES?: UNABLE TO ASSESS
WALKS IN HOME: DEPENDENT
HEARING - RIGHT EAR: UNABLE TO ASSESS
JUDGMENT_ADEQUATE_SAFELY_COMPLETE_DAILY_ACTIVITIES: UNABLE TO ASSESS
ASSISTIVE_DEVICE: WHEELCHAIR
LACK_OF_TRANSPORTATION: NO
BATHING: UNABLE TO ASSESS
JUDGMENT_ADEQUATE_SAFELY_COMPLETE_DAILY_ACTIVITIES: UNABLE TO ASSESS
JUDGMENT_ADEQUATE_SAFELY_COMPLETE_DAILY_ACTIVITIES: UNABLE TO ASSESS
FEEDING YOURSELF: UNABLE TO ASSESS

## 2023-01-01 ASSESSMENT — COGNITIVE AND FUNCTIONAL STATUS - GENERAL
MOVING TO AND FROM BED TO CHAIR: TOTAL
STANDING UP FROM CHAIR USING ARMS: TOTAL
TURNING FROM BACK TO SIDE WHILE IN FLAT BAD: TOTAL
TURNING FROM BACK TO SIDE WHILE IN FLAT BAD: TOTAL
TOILETING: TOTAL
DRESSING REGULAR UPPER BODY CLOTHING: TOTAL
HELP NEEDED FOR BATHING: TOTAL
CLIMB 3 TO 5 STEPS WITH RAILING: TOTAL
MOBILITY SCORE: 6
CLIMB 3 TO 5 STEPS WITH RAILING: TOTAL
MOVING FROM LYING ON BACK TO SITTING ON SIDE OF FLAT BED WITH BEDRAILS: TOTAL
DRESSING REGULAR LOWER BODY CLOTHING: TOTAL
DRESSING REGULAR UPPER BODY CLOTHING: TOTAL
CLIMB 3 TO 5 STEPS WITH RAILING: TOTAL
STANDING UP FROM CHAIR USING ARMS: TOTAL
WALKING IN HOSPITAL ROOM: TOTAL
TURNING FROM BACK TO SIDE WHILE IN FLAT BAD: TOTAL
EATING MEALS: TOTAL
TOILETING: TOTAL
WALKING IN HOSPITAL ROOM: TOTAL
MOVING FROM LYING ON BACK TO SITTING ON SIDE OF FLAT BED WITH BEDRAILS: TOTAL
HELP NEEDED FOR BATHING: TOTAL
MOVING TO AND FROM BED TO CHAIR: TOTAL
STANDING UP FROM CHAIR USING ARMS: TOTAL
WALKING IN HOSPITAL ROOM: TOTAL
DAILY ACTIVITIY SCORE: 6
DAILY ACTIVITIY SCORE: 6
PERSONAL GROOMING: TOTAL
MOVING FROM LYING ON BACK TO SITTING ON SIDE OF FLAT BED WITH BEDRAILS: TOTAL
DRESSING REGULAR LOWER BODY CLOTHING: TOTAL
PATIENT BASELINE BEDBOUND: YES
MOVING TO AND FROM BED TO CHAIR: TOTAL
PERSONAL GROOMING: TOTAL
DAILY ACTIVITIY SCORE: 6
MOVING TO AND FROM BED TO CHAIR: TOTAL
MOBILITY SCORE: 6
DRESSING REGULAR LOWER BODY CLOTHING: TOTAL
CLIMB 3 TO 5 STEPS WITH RAILING: TOTAL
PERSONAL GROOMING: TOTAL
TURNING FROM BACK TO SIDE WHILE IN FLAT BAD: TOTAL
TOILETING: TOTAL
MOBILITY SCORE: 6
MOVING FROM LYING ON BACK TO SITTING ON SIDE OF FLAT BED WITH BEDRAILS: TOTAL
WALKING IN HOSPITAL ROOM: TOTAL
STANDING UP FROM CHAIR USING ARMS: TOTAL
EATING MEALS: TOTAL
EATING MEALS: TOTAL
DRESSING REGULAR UPPER BODY CLOTHING: TOTAL
MOBILITY SCORE: 6
HELP NEEDED FOR BATHING: TOTAL

## 2023-01-01 ASSESSMENT — PATIENT HEALTH QUESTIONNAIRE - PHQ9
SUM OF ALL RESPONSES TO PHQ9 QUESTIONS 1 & 2: 0
1. LITTLE INTEREST OR PLEASURE IN DOING THINGS: NOT AT ALL
2. FEELING DOWN, DEPRESSED OR HOPELESS: NOT AT ALL

## 2023-01-01 ASSESSMENT — PAIN - FUNCTIONAL ASSESSMENT
PAIN_FUNCTIONAL_ASSESSMENT: CPOT (CRITICAL CARE PAIN OBSERVATION TOOL)
PAIN_FUNCTIONAL_ASSESSMENT: PAINAD (PAIN ASSESSMENT IN ADVANCED DEMENTIA SCALE)
PAIN_FUNCTIONAL_ASSESSMENT: PAINAD (PAIN ASSESSMENT IN ADVANCED DEMENTIA SCALE)
PAIN_FUNCTIONAL_ASSESSMENT: 0-10

## 2023-09-15 PROBLEM — G89.29 CHRONIC PAIN OF TOE OF LEFT FOOT: Status: ACTIVE | Noted: 2023-01-01

## 2023-09-15 PROBLEM — E55.9 VITAMIN D DEFICIENCY: Status: ACTIVE | Noted: 2023-01-01

## 2023-09-15 PROBLEM — R79.89 ELEVATED TROPONIN: Status: ACTIVE | Noted: 2023-01-01

## 2023-09-15 PROBLEM — F03.90 DEMENTIA (MULTI): Status: ACTIVE | Noted: 2023-01-01

## 2023-09-15 PROBLEM — M79.674 CHRONIC PAIN OF TOE OF RIGHT FOOT: Status: ACTIVE | Noted: 2023-01-01

## 2023-09-15 PROBLEM — L08.9 WOUND INFECTION: Status: ACTIVE | Noted: 2023-01-01

## 2023-09-15 PROBLEM — H90.5 SENSORINEURAL HEARING LOSS: Status: ACTIVE | Noted: 2023-01-01

## 2023-09-15 PROBLEM — M79.675 CHRONIC PAIN OF TOE OF LEFT FOOT: Status: ACTIVE | Noted: 2023-01-01

## 2023-09-15 PROBLEM — G47.00 INSOMNIA: Status: ACTIVE | Noted: 2023-01-01

## 2023-09-15 PROBLEM — I95.9 HYPOTENSION: Status: ACTIVE | Noted: 2023-01-01

## 2023-09-15 PROBLEM — R26.9 GAIT ABNORMALITY: Status: ACTIVE | Noted: 2023-01-01

## 2023-09-15 PROBLEM — H61.23 BILATERAL IMPACTED CERUMEN: Status: ACTIVE | Noted: 2023-01-01

## 2023-09-15 PROBLEM — I50.30 DIASTOLIC HEART FAILURE (MULTI): Status: ACTIVE | Noted: 2023-01-01

## 2023-09-15 PROBLEM — R20.0 NUMBNESS OF FOOT: Status: ACTIVE | Noted: 2023-01-01

## 2023-09-15 PROBLEM — F41.9 ANXIETY: Status: ACTIVE | Noted: 2023-01-01

## 2023-09-15 PROBLEM — F32.A DEPRESSIVE DISORDER: Status: ACTIVE | Noted: 2023-01-01

## 2023-09-15 PROBLEM — R41.89 COGNITIVE IMPAIRMENT: Status: ACTIVE | Noted: 2023-01-01

## 2023-09-15 PROBLEM — H66.90 OTITIS MEDIA: Status: ACTIVE | Noted: 2023-01-01

## 2023-09-15 PROBLEM — I95.1 ORTHOSTATIC HYPOTENSION: Status: ACTIVE | Noted: 2023-01-01

## 2023-09-15 PROBLEM — R63.4 WEIGHT LOSS, UNINTENTIONAL: Status: ACTIVE | Noted: 2023-01-01

## 2023-09-15 PROBLEM — G89.29 CHRONIC PAIN OF TOE OF RIGHT FOOT: Status: ACTIVE | Noted: 2023-01-01

## 2023-09-15 PROBLEM — H91.90 HARD OF HEARING: Status: ACTIVE | Noted: 2023-01-01

## 2023-09-15 PROBLEM — T14.8XXA WOUND INFECTION: Status: ACTIVE | Noted: 2023-01-01

## 2023-09-15 PROBLEM — D64.9 ANEMIA: Status: ACTIVE | Noted: 2023-01-01

## 2023-09-15 PROBLEM — I10 HYPERTENSION: Status: ACTIVE | Noted: 2023-01-01

## 2023-09-15 PROBLEM — E78.5 HYPERLIPIDEMIA: Status: ACTIVE | Noted: 2023-01-01

## 2023-09-15 PROBLEM — R41.3 MEMORY PROBLEM: Status: ACTIVE | Noted: 2023-01-01

## 2023-09-15 PROBLEM — H81.10 BPPV (BENIGN PAROXYSMAL POSITIONAL VERTIGO): Status: ACTIVE | Noted: 2023-01-01

## 2023-10-10 PROBLEM — E87.6 HYPOKALEMIA: Status: ACTIVE | Noted: 2023-01-01

## 2023-10-10 PROBLEM — T14.8XXA CHRONIC WOUND: Status: ACTIVE | Noted: 2023-01-01

## 2023-10-10 PROBLEM — Z76.0 ENCOUNTER FOR MEDICATION REFILL: Status: ACTIVE | Noted: 2023-01-01

## 2023-10-10 PROBLEM — Z28.21 INFLUENZA VACCINE REFUSED: Status: ACTIVE | Noted: 2023-01-01

## 2023-10-10 PROBLEM — Z71.85 VACCINE COUNSELING: Status: ACTIVE | Noted: 2023-01-01

## 2023-10-10 NOTE — PROGRESS NOTES
Subjective   Patient ID: Sheryl Shin is a 89 y.o. female who presents for Follow-up and Med Refill.    [facility] Pt is a pleasant 89 y.o. AAF who was seen and evaluated during the office follow up visit. Pt has a hx of severe dementia and was brought by her daughter who her main caregiver, pts POA and main sours of information. Based on the daughter report pt did not show any sxs of discomfort and did not report any pain recently. Pt FU with the wound clinic for the chronic wounds on the feet. Pts daughter also requested a refill for fludrocortisone pill.  Sohx: reviewed  PMHx and Fhx: reviewed      Review of Systems  Constitutional: no fever  Eyes: no eye redness  ENT: no ear pain or sore throat  Cardiovascular: no chest pain  Respiratory: no cough, no dyspnea with exertion or with rest  Gastrointestinal: no abdominal pain, no constipation or diarrhea, no heartburn, no nausea or vomiting  Genitourinary: no urinary retention  Musculoskeletal: no back pain  Integumentary: as mentioned at [facility]  Neurological: no limb weakness  Psychiatric: no mood changes  Hematologic/Lymphatic: no easy bruising or bleeding    Objective   There were no vitals taken for this visit.  Pt was not cooperative during when the MA tried to obtain the vitals    Physical Exam  Constitutional: pt appears chronically ill, no acute distress  Head and face: normocephalic, atraumatic  Eyes: no erythema, edema or visible abnormalities of conjunctiva or lids. Pupils are equal, round and reactive to light.   ENT: external ears without deformities  Neck: full ROM, no adenopathy, neck was supple  Pulmonary: normal respiratory rate and effort. Lungs are clear to auscultation, no rales,no rhonchi or wheezing  Cardiovascular: regular rate and rhythm, normal S1 and S2, no murmur, no gallop, posterior tib. pulse normal 2+ bilaterally, no lower extremity edema  Abdomen: soft, non tender on palpation, not distended, normal BS in all 4  quadrants,  Musculoskeletal: no knee joint swelling Gait and station: unable to assess. Digits and nails: normal without clubbing  Skin: normal color, no rash, unable to assess the feet due to the pts non cooperation  Neurologic: Cranial nerves: CN II: visual acuity intact. Source: acuity reported by patient. Pupils reactive to light with normal accommodation. Right and left pupil normal CN III, IV and VI: the EO movements were intact.   Psychiatric: pt is alert and oriented in person only  Lymphatic: no cervical lymphadenopathy    Assessment/Plan   Hypotension: will refill fludrocortisone 0.1 mg PO daily  Will check CBC, CMP, TSH  Hypokalemia: will check CMP and Mg level  HLD: will check lipid panel  Vitamin D deficiency: will check vitamin D level  Hx of CHF: Pt FU with the cardiology team, no recent exacerbation  Dementia, MDD: pt takes namenda and sertraline. FU with the neurology team. As per pts daughter request the script for placard was provided  Vaccine counseling: pts daughter declined the flu vaccine during this visit  Chronic wounds n the feet: Pt FU with wound clinic  Plan was d/c with the pts daughter in details, verbalized understanding, agreed  Please follow up with PCP as planned or sooner if needed

## 2023-10-19 PROBLEM — I95.0 IDIOPATHIC HYPOTENSION: Status: ACTIVE | Noted: 2023-01-01

## 2023-10-19 PROBLEM — Z51.5 HOSPICE CARE: Status: ACTIVE | Noted: 2023-01-01

## 2023-10-19 PROBLEM — R41.89 UNRESPONSIVE STATE: Status: ACTIVE | Noted: 2023-01-01

## 2023-10-19 PROBLEM — E16.2 HYPOGLYCEMIA: Status: ACTIVE | Noted: 2023-01-01

## 2023-10-19 NOTE — CONSULTS
History Of Present Illness  Sheryl Shin is a 89 y.o. female presenting with unresponsiveness. Information taken from patient's daughter at bedside. Pt had wound center appointment this morning. It was a normal morning; daughter got pt dressed and helped her to the car. In the car, she seemed less interactive and more lethargic than usual but they proceeded to her appointment. Pt was in wheelchair in lobby and they thought she was just sleeping. When they took her back to the wound center and helped her to the table, she continued to snore. They laid her down and attempted to get her blood pressure and were not able to get a reading. EMS was called. She was hypotensive and tachycardic in the EMS. In the ER, she was hypoglycemic down to 20. She continues to remain unresponsive when evaluated by DR. Owens and me.     Past Medical History  Past Medical History:   Diagnosis Date    Body mass index (BMI) 20.0-20.9, adult 11/03/2021    Body mass index (BMI) of 20.0 to 20.9 in adult    Body mass index (BMI) 22.0-22.9, adult     BMI 22.0-22.9, adult    Body mass index (BMI) 24.0-24.9, adult     BMI 24.0-24.9, adult    Personal history of other infectious and parasitic diseases 08/23/2022    History of wound infection    Syncope and collapse 10/18/2022    Syncope and collapse    Unspecified otitis externa, unspecified ear 04/14/2021    Otitis externa     Surgical History  Past Surgical History:   Procedure Laterality Date    OTHER SURGICAL HISTORY  10/31/2021    Hysterectomy    OTHER SURGICAL HISTORY  10/31/2021    Knee replacement     Social History  Social History     Tobacco Use    Smoking status: Never    Smokeless tobacco: Never   Substance Use Topics    Alcohol use: Never    Drug use: Never     Allergies  Patient has no known allergies.  (Not in a hospital admission)      Review of Systems  Unable to obtain due to unresponsiveness    Neurological Exam  Physical Exam    Mental status: Unresponsive.   CN: KIRSTEN  right gaze deviation. Weak corneal reflexes. Face appears symmetrical.  Motor/sensory: no reaction to painful stimuli    Last Recorded Vitals  Blood pressure 117/64, pulse 66, temperature 36.6 °C (97.9 °F), temperature source Temporal, resp. rate 12, weight 46.2 kg (101 lb 13.6 oz), SpO2 98 %.    Relevant Results  Scheduled medications  potassium chloride, 20 mEq, intravenous, q2h      Continuous medications  dextrose 10 % in water (D10W), 100 mL/hr      PRN medications  Results for orders placed or performed during the hospital encounter of 10/19/23 (from the past 24 hour(s))   BLOOD GAS VENOUS FULL PANEL   Result Value Ref Range    POCT pH, Venous 7.38 7.33 - 7.43 pH    POCT pCO2, Venous 63 (H) 41 - 51 mm Hg    POCT pO2, Venous 26 (L) 35 - 45 mm Hg    POCT SO2, Venous 30 (L) 45 - 75 %    POCT Oxy Hemoglobin, Venous 29.4 (L) 45.0 - 75.0 %    POCT Hematocrit Calculated, Venous 29.0 (L) 36.0 - 46.0 %    POCT Sodium, Venous 147 (H) 136 - 145 mmol/L    POCT Potassium, Venous 2.5 (LL) 3.5 - 5.3 mmol/L    POCT Chloride, Venous 109 (H) 98 - 107 mmol/L    POCT Ionized Calicum, Venous 0.99 (L) 1.10 - 1.33 mmol/L    POCT Glucose, Venous 93 74 - 99 mg/dL    POCT Lactate, Venous 2.4 (H) 0.4 - 2.0 mmol/L    POCT Base Excess, Venous 10.5 (H) -2.0 - 3.0 mmol/L    POCT HCO3 Calculated, Venous 37.3 (H) 22.0 - 26.0 mmol/L    POCT Hemoglobin, Venous 9.5 (L) 12.0 - 16.0 g/dL    POCT Anion Gap, Venous 3.0 (L) 10.0 - 25.0 mmol/L    Patient Temperature      FiO2 100 %    Critical Called By ROXANA     Critical Called To DR MCKEON     Critical Call Time 1500.0000     Critical Read Back Y    POCT GLUCOSE   Result Value Ref Range    POCT Glucose 47 (L) 74 - 99 mg/dL   CBC and Auto Differential   Result Value Ref Range    WBC 6.8 4.4 - 11.3 x10*3/uL    nRBC 0.0 0.0 - 0.0 /100 WBCs    RBC 4.68 4.00 - 5.20 x10*6/uL    Hemoglobin 10.8 (L) 12.0 - 16.0 g/dL    Hematocrit 34.4 (L) 36.0 - 46.0 %    MCV 74 (L) 80 - 100 fL    MCH 23.1 (L) 26.0 - 34.0 pg     MCHC 31.4 (L) 32.0 - 36.0 g/dL    RDW 15.9 (H) 11.5 - 14.5 %    Platelets 207 150 - 450 x10*3/uL    MPV 10.5 7.5 - 11.5 fL    Neutrophils % 29.5 40.0 - 80.0 %    Immature Granulocytes %, Automated 0.1 0.0 - 0.9 %    Lymphocytes % 65.4 13.0 - 44.0 %    Monocytes % 4.8 2.0 - 10.0 %    Eosinophils % 0.1 0.0 - 6.0 %    Basophils % 0.1 0.0 - 2.0 %    Neutrophils Absolute 2.00 1.60 - 5.50 x10*3/uL    Immature Granulocytes Absolute, Automated 0.01 0.00 - 0.50 x10*3/uL    Lymphocytes Absolute 4.46 (H) 0.80 - 3.00 x10*3/uL    Monocytes Absolute 0.33 0.05 - 0.80 x10*3/uL    Eosinophils Absolute 0.01 0.00 - 0.40 x10*3/uL    Basophils Absolute 0.01 0.00 - 0.10 x10*3/uL   Comprehensive Metabolic Panel   Result Value Ref Range    Glucose 98 74 - 99 mg/dL    Sodium 150 (H) 136 - 145 mmol/L    Potassium 2.7 (LL) 3.5 - 5.3 mmol/L    Chloride 107 98 - 107 mmol/L    Bicarbonate 36 (H) 21 - 32 mmol/L    Anion Gap 10 10 - 20 mmol/L    Urea Nitrogen 25 (H) 6 - 23 mg/dL    Creatinine 0.80 0.50 - 1.05 mg/dL    eGFR 68 >60 mL/min/1.73m*2    Calcium 7.5 (L) 8.6 - 10.3 mg/dL    Albumin 2.4 (L) 3.4 - 5.0 g/dL    Alkaline Phosphatase 40 33 - 136 U/L    Total Protein 4.9 (L) 6.4 - 8.2 g/dL    AST 18 9 - 39 U/L    Bilirubin, Total 0.3 0.0 - 1.2 mg/dL    ALT 7 7 - 45 U/L   Lactate   Result Value Ref Range    Lactate 2.5 (H) 0.4 - 2.0 mmol/L   Troponin I, High Sensitivity   Result Value Ref Range    Troponin I, High Sensitivity 332 (HH) 0 - 13 ng/L   POCT GLUCOSE   Result Value Ref Range    POCT Glucose 20 (L) 74 - 99 mg/dL   POCT GLUCOSE   Result Value Ref Range    POCT Glucose 12 (L) 74 - 99 mg/dL   POCT GLUCOSE   Result Value Ref Range    POCT Glucose 156 (H) 74 - 99 mg/dL     XR chest 1 view    Result Date: 10/19/2023  Interpreted By:  Ej Pena, STUDY: XR CHEST 1 VIEW;  10/19/2023 4:14 pm   INDICATION: Signs/Symptoms:Respiratory arrest.   COMPARISON: Chest x-rays, most recent from 09/01/2022.   ACCESSION NUMBER(S): [id number]    ORDERING CLINICIAN: CONSTANTINO MCKEON   TECHNIQUE: Single AP supine portable view of the chest was obtained.   FINDINGS: MEDIASTINUM/ LUNGS/ MADHU: Mild cardiomegaly, currently without vascular congestion, or pleural effusion. No abnormal opacity in either lung worrisome for tumor or pneumonia. No pneumothorax. No tracheal deviation. No abnormal hilar fullness or gross mass on either side.   BONES: No lytic or blastic destructive bone lesion.   UPPER ABDOMEN: Grossly intact.       Mild cardiomegaly.  Currently without radiographic evidence of CHF or pneumonia.   Signed by: Ej Pena 10/19/2023 4:28 PM Dictation workstation:   [id number]    CT brain attack head wo IV contrast    Result Date: 10/19/2023  Interpreted By:  Ej Pena, STUDY: CT BRAIN ATTACK HEAD WO IV CONTRAST;  10/19/2023 3:41 pm   INDICATION: Signs/Symptoms:Altered mental status, L side weakness.   COMPARISON: Previous exam is from 09/01/2022..   ACCESSION NUMBER(S): [id number]   ORDERING CLINICIAN: CONSTANTINO MCKEON   TECHNIQUE: Routine axial images were obtained from the skull base through the vertex.  Sagittal and coronal reconstruction images were generated. Brain, subdural, and bone windows were reviewed.   FINDINGS: INTRACRANIAL: Moderate prominence of ventricles and sulci. There is moderate patchy hypodensity throughout the deep periventricular white matter. Physiologic calcifications in the bilateral basal ganglia. No acute intracranial bleed, midline shift, or focal mass effect. No destructive bone lesion. No depressed skull fracture. Skullbase arterial calcifications in the carotid siphons and vertebral arteries.   EXTRACRANIAL: Visualized paranasal sinuses were clear. Visualized mastoid air cells were clear.       No acute intracranial bleed or focal mass effect.   Moderate volume loss.   Moderate chronic white matter ischemic disease in the deep periventricular regions.   Signed by: Ej Pena 10/19/2023 3:48 PM Dictation  workstation:   [id number]                                   I have personally reviewed the following imaging results XR chest 1 view    Result Date: 10/19/2023  Interpreted By:  Ej Pena, STUDY: XR CHEST 1 VIEW;  10/19/2023 4:14 pm   INDICATION: Signs/Symptoms:Respiratory arrest.   COMPARISON: Chest x-rays, most recent from 09/01/2022.   ACCESSION NUMBER(S): [id number]   ORDERING CLINICIAN: CONSTANTINO MCKEON   TECHNIQUE: Single AP supine portable view of the chest was obtained.   FINDINGS: MEDIASTINUM/ LUNGS/ MADHU: Mild cardiomegaly, currently without vascular congestion, or pleural effusion. No abnormal opacity in either lung worrisome for tumor or pneumonia. No pneumothorax. No tracheal deviation. No abnormal hilar fullness or gross mass on either side.   BONES: No lytic or blastic destructive bone lesion.   UPPER ABDOMEN: Grossly intact.       Mild cardiomegaly.  Currently without radiographic evidence of CHF or pneumonia.   Signed by: Ej Pena 10/19/2023 4:28 PM Dictation workstation:   [id number]    CT brain attack head wo IV contrast    Result Date: 10/19/2023  Interpreted By:  Ej Pena, STUDY: CT BRAIN ATTACK HEAD WO IV CONTRAST;  10/19/2023 3:41 pm   INDICATION: Signs/Symptoms:Altered mental status, L side weakness.   COMPARISON: Previous exam is from 09/01/2022..   ACCESSION NUMBER(S): [id number]   ORDERING CLINICIAN: CONSTANTINO MCKEON   TECHNIQUE: Routine axial images were obtained from the skull base through the vertex.  Sagittal and coronal reconstruction images were generated. Brain, subdural, and bone windows were reviewed.   FINDINGS: INTRACRANIAL: Moderate prominence of ventricles and sulci. There is moderate patchy hypodensity throughout the deep periventricular white matter. Physiologic calcifications in the bilateral basal ganglia. No acute intracranial bleed, midline shift, or focal mass effect. No destructive bone lesion. No depressed skull fracture. Skullbase arterial  calcifications in the carotid siphons and vertebral arteries.   EXTRACRANIAL: Visualized paranasal sinuses were clear. Visualized mastoid air cells were clear.       No acute intracranial bleed or focal mass effect.   Moderate volume loss.   Moderate chronic white matter ischemic disease in the deep periventricular regions.   Signed by: Ej Pena 10/19/2023 3:48 PM Dictation workstation:   [id number]  .      Assessment/Plan   Active Problems:  There are no active Hospital Problems.      Unresponsiveness  - Likely 2/2 general medical condition including hypoglycemia    PLAN:    If no improvement tomorrow, consider MRI brain and video EEG. Supportive care.  Keppra 500mg IV BID    Case/plan discussed and pt seen with DR. Owens.         Alicja Dejesus, ANTHONY-CNP        I saw and evaluated the patient.  I obtained the key portions of the history and examination.  I reviewed the residents/APNs note, discussed the patient and supervised treatment plan formulation.    This is a 89 year old female with advanced dementia presenting for evaluation of unresponsive episode.  On exam, she is noted to have right eye deviation which is not broken by dolls maneuver.  She is unresponsive.  Labs reviewed and notable for hypoglycemia.  CT Brain reviewed and was notably unremarkable.     Unresponsive Episode  - ddx includes hypoglycemia induced seizure, stroke is also a consideration  - correct hypoglycemia  - okay to use Keppra for now  - if no improvement after correction of hypoglycemia, consider MRI Brain and vEEG    Eran Owens MD  [facility]  Department of Neurology

## 2023-10-19 NOTE — ED PROVIDER NOTES
HPI   Chief Complaint   Patient presents with    unresponsive       Patient is a 89-year-old female with dementia, hypotension who presents emergency department for unresponsiveness.  Patient is conversant at baseline but does not maintain a train of thought and normal conversation.  She was in her normal state of health this morning with her daughter and son-in-law.  She has chronic right toe wound and sacral decubitus ulcer for which she went into her wound care center today for a checkup.  In the waiting room, she seemed to fall asleep and they were unable to wake her up.  EMS was called and patient was brought to the emergency department.  She was noted to be hypotensive with systolic in the 60s and tachycardic in the 120s.  She had slow respirations and EMS was bagging the patient.  Patient remains encephalopathic in the emergency department and review of systems is unable to be obtained beyond collateral information from the daughter and son-in-law.      History provided by:  EMS personnel and relative                      No data recorded                Patient History   Past Medical History:   Diagnosis Date    Body mass index (BMI) 20.0-20.9, adult 11/03/2021    Body mass index (BMI) of 20.0 to 20.9 in adult    Body mass index (BMI) 22.0-22.9, adult     BMI 22.0-22.9, adult    Body mass index (BMI) 24.0-24.9, adult     BMI 24.0-24.9, adult    Personal history of other infectious and parasitic diseases 08/23/2022    History of wound infection    Syncope and collapse 10/18/2022    Syncope and collapse    Unspecified otitis externa, unspecified ear 04/14/2021    Otitis externa     Past Surgical History:   Procedure Laterality Date    OTHER SURGICAL HISTORY  10/31/2021    Hysterectomy    OTHER SURGICAL HISTORY  10/31/2021    Knee replacement     No family history on file.  Social History     Tobacco Use    Smoking status: Never    Smokeless tobacco: Never   Substance Use Topics    Alcohol use: Never    Drug  use: Never       Physical Exam   ED Triage Vitals [10/19/23 1456]   Temp Heart Rate Resp BP   -- 85 13 85/69      SpO2 Temp src Heart Rate Source Patient Position   100 % -- -- --      BP Location FiO2 (%)     -- --       Physical Exam  Vitals and nursing note reviewed.   Constitutional:       General: She is not in acute distress.     Appearance: She is well-developed. She is not ill-appearing or toxic-appearing.      Comments: Lying in the bed, no acute distress, unresponsive.   HENT:      Head: Normocephalic and atraumatic.      Right Ear: External ear normal.      Left Ear: External ear normal.      Nose: Nose normal.      Mouth/Throat:      Mouth: Mucous membranes are moist.   Eyes:      General: No scleral icterus.     Conjunctiva/sclera: Conjunctivae normal.      Comments: Eyes deviated to the right bilaterally.  Left pupil is slightly oblong, but approximately 3 to 4 mm and is minimally reactive.   Cardiovascular:      Rate and Rhythm: Regular rhythm. Tachycardia present.      Heart sounds: No murmur heard.     Comments: Faint pulses in all 4 extremities.  Pulmonary:      Effort: Pulmonary effort is normal. No respiratory distress.      Breath sounds: Normal breath sounds. No stridor. No wheezing, rhonchi or rales.   Abdominal:      General: Abdomen is flat.      Palpations: Abdomen is soft.      Tenderness: There is no guarding or rebound.   Musculoskeletal:         General: No swelling or deformity.      Cervical back: Neck supple.   Skin:     General: Skin is warm and dry.      Findings: Lesion present.      Comments: Small, chronic right toe ulcer.  Approximately quarter sized sacral decubitus ulcer, unstageable.   Neurological:      Comments: GCS 3.  Does not respond to noxious stimuli in any extremity.     Patient is a 89-year-old female with    ED Course & MDM   Diagnoses as of 10/20/23 0129   Altered mental status, unspecified altered mental status type   Shock (CMS/HCC)   Hypokalemia   Elevated  troponin       Medical Decision Making  Patient is a 89-year-old female who presents emerged part for unresponsiveness.  Patient has a weak pulse and is hypotensive with systolic in the 60s.  She is having slow respiratory issues and is being bagged by EMS on arrival.  GCS 3.  No stridor, snoring, or gurgling on auscultation.  Tachycardic in the 120s.  Afebrile.  Although she has a low GCS and there is concern for inability to protect airway, she is significantly hypotensive and there is concern that she may undergo cardiac arrest should RSI medications be administered.  She is easy to bag and saturating well with the bag-valve-mask.  Attention was turned towards resuscitation for her tachycardia and hypotension.  Patient is a pressure bag 2 L of IV fluids with mild improvement in her blood pressure.  Phenylephrine 200 mcg x 2 was pushed, which did show improvement.  She was able to be transitioned to nonrebreather with respiration rate 12-14 with good oxygen saturation.  Her eyes were noted to be deviated towards the right.  Her right arm was contracted with minor tremors.  There was concern for possible seizure.  Point-of-care blood glucose showed 47, so she was given an amp of D50.  She did not have any change in her mental status.  Given that she is hemodynamically unstable, Ativan 0.5 mg IV was administered.  Brain attack was called given the acute altered mental status change, possible seizures, and right eye deviation that may also signal intracranial hemorrhage.  Goals of care discussion was had with the patient's healthcare power of , her daughter.  We spoke with her daughter and son-in-law.  They do not believe the patient would want to be intubated or resuscitated in the event of cardiac arrest.  Patient is made DNR CCA, DNI.    CT brain attack head wo IV contrast   Final Result    No acute intracranial bleed or focal mass effect.          Moderate volume loss.          Moderate chronic white  matter ischemic disease in the deep    periventricular regions.          Signed by: Ej Pena 10/19/2023 3:48 PM    Dictation workstation:   [id number]      On return from the CT scan, patient still has the eye deviation and right arm flexion.  She is given another Ativan 0.5 mg IV.  Neurology was consulted and they evaluated the patient in the emergency department.  They do not feel that these are likely seizures and recommended Keppra 1 g IV for now recommended further evaluation after admission.    Given that the patient was tachycardic and hypotensive on arrival, septic work-up including blood cultures and lactate are obtained.  However, no obvious source of infection at this time, IV antibiotics are held.  Patient did receive 2 L of IV fluid bolus initially and she is given another liter to gravity given that she remains hypotensive.    Results for orders placed or performed during the hospital encounter of 10/19/23  -BLOOD GAS VENOUS FULL PANEL:        Result                      Value             Ref Range           POCT pH, Venous             7.38              7.33 - 7.43 *       POCT pCO2, Venous           63 (H)            41 - 51 mm Hg       POCT pO2, Venous            26 (L)            35 - 45 mm Hg       POCT SO2, Venous            30 (L)            45 - 75 %           POCT Oxy Hemoglobin, V*     29.4 (L)          45.0 - 75.0 %       POCT Hematocrit Calcul*     29.0 (L)          36.0 - 46.0 %       POCT Sodium, Venous         147 (H)           136 - 145 mm*       POCT Potassium, Venous      2.5 (LL)          3.5 - 5.3 mm*       POCT Chloride, Venous       109 (H)           98 - 107 mmo*       POCT Ionized Calicum, *     0.99 (L)          1.10 - 1.33 *       POCT Glucose, Venous        93                74 - 99 mg/dL       POCT Lactate, Venous        2.4 (H)           0.4 - 2.0 mm*       POCT Base Excess, Veno*     10.5 (H)          -2.0 - 3.0 m*       POCT HCO3 Calculated, *     37.3 (H)           22.0 - 26.0 *       POCT Hemoglobin, Venous     9.5 (L)           12.0 - 16.0 *       POCT Anion Gap, Venous      3.0 (L)           10.0 - 25.0 *       Patient Temperature                                               FiO2                        100               %                   Critical Called By          ROXANA                                    Critical Called To          DR MCKEON                            Critical Call Time          1500.0000                             Critical Read Back          Y                                -CBC and Auto Differential:        Result                      Value             Ref Range           WBC                         6.8               4.4 - 11.3 x*       nRBC                        0.0               0.0 - 0.0 /1*       RBC                         4.68              4.00 - 5.20 *       Hemoglobin                  10.8 (L)          12.0 - 16.0 *       Hematocrit                  34.4 (L)          36.0 - 46.0 %       MCV                         74 (L)            80 - 100 fL         MCH                         23.1 (L)          26.0 - 34.0 *       MCHC                        31.4 (L)          32.0 - 36.0 *       RDW                         15.9 (H)          11.5 - 14.5 %       Platelets                   207               150 - 450 x1*       MPV                         10.5              7.5 - 11.5 fL       Neutrophils %               29.5              40.0 - 80.0 %       Immature Granulocytes *     0.1               0.0 - 0.9 %         Lymphocytes %               65.4              13.0 - 44.0 %       Monocytes %                 4.8               2.0 - 10.0 %        Eosinophils %               0.1               0.0 - 6.0 %         Basophils %                 0.1               0.0 - 2.0 %         Neutrophils Absolute        2.00              1.60 - 5.50 *       Immature Granulocytes *     0.01              0.00 - 0.50 *       Lymphocytes Absolute        4.46 (H)          0.80  - 3.00 *       Monocytes Absolute          0.33              0.05 - 0.80 *       Eosinophils Absolute        0.01              0.00 - 0.40 *       Basophils Absolute          0.01              0.00 - 0.10 *  -Comprehensive Metabolic Panel:        Result                      Value             Ref Range           Glucose                     98                74 - 99 mg/dL       Sodium                      150 (H)           136 - 145 mm*       Potassium                   2.7 (LL)          3.5 - 5.3 mm*       Chloride                    107               98 - 107 mmo*       Bicarbonate                 36 (H)            21 - 32 mmol*       Anion Gap                   10                10 - 20 mmol*       Urea Nitrogen               25 (H)            6 - 23 mg/dL        Creatinine                  0.80              0.50 - 1.05 *       eGFR                        68                >60 mL/min/1*       Calcium                     7.5 (L)           8.6 - 10.3 m*       Albumin                     2.4 (L)           3.4 - 5.0 g/*       Alkaline Phosphatase        40                33 - 136 U/L        Total Protein               4.9 (L)           6.4 - 8.2 g/*       AST                         18                9 - 39 U/L          Bilirubin, Total            0.3               0.0 - 1.2 mg*       ALT                         7                 7 - 45 U/L     -Lactate:        Result                      Value             Ref Range           Lactate                     2.5 (H)           0.4 - 2.0 mm*  -Troponin I, High Sensitivity:        Result                      Value             Ref Range           Troponin I, High Sensi*     332 (HH)          0 - 13 ng/L    -Urinalysis with Reflex Microscopic and Culture:        Result                      Value             Ref Range           Color, Urine                Straw             Straw, Yellow       Appearance, Urine           Clear             Clear               Specific Gravity,  Urine     1.005             1.005 - 1.035       pH, Urine                   7.0               5.0, 5.5, 6.*       Protein, Urine              NEGATIVE          NEGATIVE mg/*       Glucose, Urine                                NEGATIVE mg/*   >=500 (3+) (A)       Blood, Urine                                  NEGATIVE        SMALL (1+) (A)       Ketones, Urine              NEGATIVE          NEGATIVE mg/*       Bilirubin, Urine            NEGATIVE          NEGATIVE            Urobilinogen, Urine         <2.0              <2.0 mg/dL          Nitrite, Urine              NEGATIVE          NEGATIVE            Leukocyte Esterase, Ur*     NEGATIVE          NEGATIVE       -Extra Urine Gray Tube:        Result                      Value             Ref Range           Extra Tube                                                    Hold for add-ons.  -Lactate:        Result                      Value             Ref Range           Lactate                     2.4 (H)           0.4 - 2.0 mm*  -Troponin I, High Sensitivity:        Result                      Value             Ref Range           Troponin I, High Sensi*     346 (HH)          0 - 13 ng/L    -BLOOD GAS ARTERIAL FULL PANEL:        Result                      Value             Ref Range           POCT pH, Arterial           7.41              7.38 - 7.42 *       POCT pCO2, Arterial         51 (H)            38 - 42 mm Hg       POCT pO2, Arterial          224 (H)           85 - 95 mm Hg       POCT SO2, Arterial          98                94 - 100 %          POCT Oxy Hemoglobin, A*     97.9              94.0 - 98.0 %       POCT Hematocrit Calcul*     32.0 (L)          36.0 - 46.0 %       POCT Sodium, Arterial       148 (H)           136 - 145 mm*       POCT Potassium, Arteri*     1.7 (LL)          3.5 - 5.3 mm*       POCT Chloride, Arterial     113 (H)           98 - 107 mmo*       POCT Ionized Calcium, *     1.03 (L)          1.10 - 1.33 *       POCT Glucose, Arterial       350 (H)           74 - 99 mg/dL       POCT Lactate, Arterial      1.2               0.4 - 2.0 mm*       POCT Base Excess, Kenia*     6.6 (H)           -2.0 - 3.0 m*       POCT HCO3 Calculated, *     32.3 (H)          22.0 - 26.0 *       POCT Hemoglobin, Arter*     10.6 (L)          12.0 - 16.0 *       POCT Anion Gap, Arteri*     4 (L)             10 - 25 mmo/L       Patient Temperature                                               FiO2                        100               %                   Critical Called By          ROXANA                                    Critical Called To          DR MCKEON                            Critical Call Time          1727.0000                             Critical Read Back          Y                                -POCT GLUCOSE:        Result                      Value             Ref Range           POCT Glucose                47 (L)            74 - 99 mg/dL  -POCT GLUCOSE:        Result                      Value             Ref Range           POCT Glucose                20 (L)            74 - 99 mg/dL  -POCT GLUCOSE:        Result                      Value             Ref Range           POCT Glucose                12 (L)            74 - 99 mg/dL  -POCT GLUCOSE:        Result                      Value             Ref Range           POCT Glucose                156 (H)           74 - 99 mg/dL  -POCT GLUCOSE:        Result                      Value             Ref Range           POCT Glucose                88                74 - 99 mg/dL  -Urinalysis Microscopic:        Result                      Value             Ref Range           WBC, Urine                  1-5               1-5, NONE /H*       RBC, Urine                  1-2               NONE, 1-2, 3*     XR chest 1 view   Final Result    Mild cardiomegaly.  Currently without radiographic evidence of CHF or    pneumonia.          Signed by: Ej Pena 10/19/2023 4:28 PM    Dictation workstation:   [id number]        Patient has hypokalemia 2.7.  Potassium IV was initiated.  Troponin returned elevated in the 300s.  We discussed this with Dr. Woo, cardiology  on call.  In context of her hypotension and tachycardia, he believes that it is likely secondary to demand/supply mismatch and not occlusive disease.  She had a point of care glucose in the 20s during one of the glucose checks, and it was refractory to D50 bolus so she was started on a D10 infusion.  Repeat blood glucose check after this was in the 200s.  Her abnormal arm movements did resolve.  Throughout her stay in the emergency department, her blood pressure has remained labile, dropping down into the 60s systolic again.  She did not have any change in her alertness level.  Additionally, she appeared to be in some increased pressor distress, likely from fluid overload as she did develop rhonchi in her lungs on auscultation.  Oxygen saturation remained good however.  Given that she remained hypotensive and we are unable to provide additional IV fluids, patient started on peripheral norepinephrine.  Ongoing goals of care discussions were had with the family, including the patient's extended family members including patient's grandmother, sister, and brother.  They do not believe that the patient would want central lines for the IV pressors, nor intubation and additional work-up and procedures.  They decided to have the patient be transition to DNR CC and hospice.  Potassium, Keppra, and D10 infusions were discontinued.  Hospice was consulted and they evaluated the patient in the emergency department.  There are no beds available at their facilities at this time and were unable to discharge patient home as they do not have the appropriate equipment for hospice at home.  It was decided to admit the patient here under hospice care to have the patient remain comfortable and have any pain, respiratory distress, or anxiety controlled.  Family is in agreement this  plan.    Patient was admitted to the hospitalist under GIP admission.  However, after admission, she showed improvement in her mentation.  She is more awake, although not alert oriented and remains nonverbal.  Her blood pressure did improve, blood pressures in the 130s and 140s systolic.  Given this improvement, patient's family wish to revoke hospice.  Dr. Cali, hospitalist, spoke with the patient's family.  Patient is switched to DNR CCA, DNI.  Given that her blood pressure has improved, plan is to have the patient admitted to the floor under telemetry.    Ej Arrington DO, PGY 3  Emergency Medicine Resident        Amount and/or Complexity of Data Reviewed  Labs: ordered.  Radiology: ordered and independent interpretation performed.  ECG/medicine tests: ordered and independent interpretation performed.        Procedure  Procedures     Ej Arrington DO  Resident  10/20/23 0132       Ej Arrington DO  Resident  10/20/23 0988

## 2023-10-19 NOTE — ED TRIAGE NOTES
Pt found in lobby at wound clinic in wheelchair presenting with minimal breath sounds.  Ems began bagging pt and transferred to ED. Pt came in being bagged by EMS with low BP

## 2023-10-20 PROBLEM — R41.82 ALTERED MENTAL STATUS, UNSPECIFIED ALTERED MENTAL STATUS TYPE: Status: ACTIVE | Noted: 2023-01-01

## 2023-10-20 NOTE — DISCHARGE SUMMARY
Discharge Diagnosis  Hospice care    Issues Requiring Follow-Up  None    Discharge Meds     Your medication list        CONTINUE taking these medications        Instructions Last Dose Given Next Dose Due   aspirin 81 mg EC tablet           cholecalciferol 5,000 Units tablet  Commonly known as: Vitamin D-3           cranberry extract 200 mg capsule           fludrocortisone 0.1 mg tablet  Commonly known as: Florinef      Take 1 tablet (0.1 mg) by mouth once daily.       gentamicin 0.1 % cream  Commonly known as: Garamycin           latanoprost 0.005 % ophthalmic solution  Commonly known as: Xalatan           memantine 10 mg tablet  Commonly known as: Namenda           midodrine 2.5 mg tablet  Commonly known as: Proamatine           potassium chloride 20 mEq packet  Commonly known as: Klor-Con      Take 20 mEq by mouth once daily for 14 days.                Test Results Pending At Discharge  Pending Labs       Order Current Status    Blood Gas Lactic Acid, Venous In process    Blood Culture Preliminary result    Blood Culture Preliminary result            Hospital Course  Sheryl Shin is a 89 y.o. female presenting with her family members due to an unresponsive episode.  Patient was reportedly at her cognitive baseline described as alert and oriented x2-3, confused with baseline dementia, earlier in the day when she was evaluated at the [facility] for routine checkup.  Patient was initially thought to be asleep however after several attempts it was noted the patient could not be aroused.  EMS was called and patient was brought to the emergency department where she was found to be hypotensive with blood pressures in the 60s and tachycardic into the 120s.  She was profoundly hypoglycemic and administered IV dextrose.  Intubation was deferred with family present at bedside.  Following pressure bag of fluid x2 L, as well as 400 mics of phenylephrine, blood pressure did improve and stabilize.  Patient was  placed on nonrebreather and maintaining good oxygen saturation.  Case was discussed with neurology service as despite their resuscitative efforts and reassuring vital signs, patient remained unresponsive.  CT scan was obtained due to right eye deviation and on flexion.  Neurology service consulted and able to evaluate patient in the emergency department.  Recommended giving 1 g of Keppra in the event that this represented seizure activity.  Patient became further hypotensive and goals of care discussion with family will transition to palliative measures.  Family at 1 point had elected to pursue DNR comfort care status and hospice was consulted.  Hospice nurse was present at bedside and consents were signed with [facility].  However shortly thereafter, patient began to wake up, blood pressure remained stable, and family then elected to withdrawal their request for hospice care and patient's CODE STATUS was updated to DNAR DNI with limitations.  Family stated they would not want invasive measures such as central line, intubation, invasive procedures.  They understand that this CODE STATUS carries with it a great degree of subjectivity and they will be available for clarification if needed especially during the initial aspect of this hospital admission.  Patient reassessed x2 in the emergency department.  Remains minimally responsive but hemodynamically stable.  Family remains at bedside and in agreement with plan of care.  To admit as a DNAR DNI with limitations.  Will be evaluated by neurology service.  Further plan of care pending work-up.     The following morning, her mentation improved and I met with pt/family. They still elected to pursue hospice given her cognitive decline and co morbidities, which I felt was appropriate. Palliative was consulted, HOWR met with family in the evening and plan for transportation to home hospice on 10/22. Neurology did elect to keep her on Keppra but did not  initiate further testing. SLP was consulted to evaluate her for dysphagia--recommended minced/wet food and thin liquids. Potassium was repleted, and as we elect to continue pharmacotherapy she could benefit from an increase of her daily K. Otherwise placed in discharge status on 10/20.    Pertinent Physical Exam At Time of Discharge  Physical Exam  Vitals reviewed.   Constitutional:       General: She is not in acute distress.     Appearance: Normal appearance. She is ill-appearing.   HENT:      Head: Normocephalic and atraumatic.      Right Ear: External ear normal.      Left Ear: External ear normal.      Nose: Nose normal.      Mouth/Throat:      Mouth: Mucous membranes are moist.      Pharynx: Oropharynx is clear.   Eyes:      Extraocular Movements: Extraocular movements intact.   Cardiovascular:      Rate and Rhythm: Normal rate and regular rhythm.   Pulmonary:      Effort: Pulmonary effort is normal. No respiratory distress.      Breath sounds: Normal breath sounds.   Abdominal:      General: There is no distension.      Palpations: Abdomen is soft.      Tenderness: There is no abdominal tenderness.   Musculoskeletal:         General: No signs of injury. Normal range of motion.      Cervical back: Normal range of motion.   Skin:     General: Skin is warm and dry.   Neurological:      General: No focal deficit present.      Mental Status: She is alert. Mental status is at baseline. She is disoriented.   Psychiatric:         Mood and Affect: Mood normal.         Behavior: Behavior normal.         Outpatient Follow-Up  Future Appointments   Date Time Provider Department Center   4/9/2024  2:30 PM Leandra Rod MD [id number] Joshua GAITAN was the attending physician for this patient. Coordination of care and discharge process provided by me on day of discharge was > 30 minutes.    Luis Hart DO  /[facility]

## 2023-10-20 NOTE — H&P
History Of Present Illness  Sheryl Shin is a 89 y.o. female presenting with her family members due to an unresponsive episode.  Patient was reportedly at her cognitive baseline described as alert and oriented x2-3, confused with baseline dementia, earlier in the day when she was evaluated at the [facility] for routine checkup.  Patient was initially thought to be asleep however after several attempts it was noted the patient could not be aroused.  EMS was called and patient was brought to the emergency department where she was found to be hypotensive with blood pressures in the 60s and tachycardic into the 120s.  She was profoundly hypoglycemic and administered IV dextrose.  Intubation was deferred with family present at bedside.  Following pressure bag of fluid x2 L, as well as 400 mics of phenylephrine, blood pressure did improve and stabilize.  Patient was placed on nonrebreather and maintaining good oxygen saturation.  Case was discussed with neurology service as despite their resuscitative efforts and reassuring vital signs, patient remained unresponsive.  CT scan was obtained due to right eye deviation and on flexion.  Neurology service consulted and able to evaluate patient in the emergency department.  Recommended giving 1 g of Keppra in the event that this represented seizure activity.  Patient became further hypotensive and goals of care discussion with family will transition to palliative measures.  Family at 1 point had elected to pursue DNR comfort care status and hospice was consulted.  Hospice nurse was present at bedside and consents were signed with hospice [facility].  However shortly thereafter, patient began to wake up, blood pressure remained stable, and family then elected to withdrawal their request for hospice care and patient's CODE STATUS was updated to DNAR DNI with limitations.  Family stated they would not want invasive measures such as central line, intubation,  invasive procedures.  They understand that this CODE STATUS carries with it a great degree of subjectivity and they will be available for clarification if needed especially during the initial aspect of this hospital admission.  Patient reassessed x2 in the emergency department.  Remains minimally responsive but hemodynamically stable.  Family remains at bedside and in agreement with plan of care.  To admit as a DNAR DNI with limitations.  Will be evaluated by neurology service.  Further plan of care pending work-up.     Past Medical History  Past Medical History:   Diagnosis Date    Body mass index (BMI) 20.0-20.9, adult 11/03/2021    Body mass index (BMI) of 20.0 to 20.9 in adult    Body mass index (BMI) 22.0-22.9, adult     BMI 22.0-22.9, adult    Body mass index (BMI) 24.0-24.9, adult     BMI 24.0-24.9, adult    Personal history of other infectious and parasitic diseases 08/23/2022    History of wound infection    Syncope and collapse 10/18/2022    Syncope and collapse    Unspecified otitis externa, unspecified ear 04/14/2021    Otitis externa       Surgical History  Past Surgical History:   Procedure Laterality Date    OTHER SURGICAL HISTORY  10/31/2021    Hysterectomy    OTHER SURGICAL HISTORY  10/31/2021    Knee replacement        Social History  She reports that she has never smoked. She has never used smokeless tobacco. She reports that she does not drink alcohol and does not use drugs.    Family History  No family history on file.     Allergies  Patient has no known allergies.    Review of Systems   Unable to perform ROS: Patient unresponsive        Physical Exam  Vitals reviewed.   Constitutional:       Appearance: Normal appearance.      Comments: Patient appears comfortable but is only minimally responsive to verbal and tactile stimuli.  She does orient to her name/sound of someone's voice.  Family reports she was able to eat a popsicle and candy bar without difficulty.   HENT:      Head: Normocephalic  and atraumatic.      Nose: Nose normal.      Mouth/Throat:      Mouth: Mucous membranes are dry.      Pharynx: Oropharynx is clear.   Eyes:      Extraocular Movements: Extraocular movements intact.      Conjunctiva/sclera: Conjunctivae normal.      Pupils: Pupils are equal, round, and reactive to light.   Cardiovascular:      Rate and Rhythm: Regular rhythm. Bradycardia present.      Pulses: Normal pulses.      Heart sounds: Normal heart sounds.   Pulmonary:      Effort: Pulmonary effort is normal.      Breath sounds: Normal breath sounds.   Abdominal:      General: Bowel sounds are normal.      Palpations: Abdomen is soft.   Musculoskeletal:         General: Normal range of motion.      Cervical back: Normal range of motion and neck supple.   Skin:     General: Skin is warm and dry.   Neurological:      General: No focal deficit present.      Mental Status: She is alert. She is disoriented.   Psychiatric:         Mood and Affect: Mood normal.          Last Recorded Vitals  Blood pressure 113/71, pulse 56, temperature 36.6 °C (97.9 °F), temperature source Temporal, resp. rate 14, weight 46.2 kg (101 lb 13.6 oz), SpO2 94 %.    Relevant Results  Scheduled medications  potassium chloride, 20 mEq, intravenous, q2h      Continuous medications     PRN medications  XR chest 1 view    Result Date: 10/19/2023  Interpreted By:  Ej Pena, STUDY: XR CHEST 1 VIEW;  10/19/2023 4:14 pm   INDICATION: Signs/Symptoms:Respiratory arrest.   COMPARISON: Chest x-rays, most recent from 09/01/2022.   ACCESSION NUMBER(S): [id number]   ORDERING CLINICIAN: CONSTANTINO MCKEON   TECHNIQUE: Single AP supine portable view of the chest was obtained.   FINDINGS: MEDIASTINUM/ LUNGS/ MADHU: Mild cardiomegaly, currently without vascular congestion, or pleural effusion. No abnormal opacity in either lung worrisome for tumor or pneumonia. No pneumothorax. No tracheal deviation. No abnormal hilar fullness or gross mass on either side.   BONES: No  lytic or blastic destructive bone lesion.   UPPER ABDOMEN: Grossly intact.       Mild cardiomegaly.  Currently without radiographic evidence of CHF or pneumonia.   Signed by: Ej Pena 10/19/2023 4:28 PM Dictation workstation:   [id number]    CT brain attack head wo IV contrast    Result Date: 10/19/2023  Interpreted By:  Ej Pena, STUDY: CT BRAIN ATTACK HEAD WO IV CONTRAST;  10/19/2023 3:41 pm   INDICATION: Signs/Symptoms:Altered mental status, L side weakness.   COMPARISON: Previous exam is from 09/01/2022..   ACCESSION NUMBER(S): [id number]   ORDERING CLINICIAN: CONSTANTINO MCKEON   TECHNIQUE: Routine axial images were obtained from the skull base through the vertex.  Sagittal and coronal reconstruction images were generated. Brain, subdural, and bone windows were reviewed.   FINDINGS: INTRACRANIAL: Moderate prominence of ventricles and sulci. There is moderate patchy hypodensity throughout the deep periventricular white matter. Physiologic calcifications in the bilateral basal ganglia. No acute intracranial bleed, midline shift, or focal mass effect. No destructive bone lesion. No depressed skull fracture. Skullbase arterial calcifications in the carotid siphons and vertebral arteries.   EXTRACRANIAL: Visualized paranasal sinuses were clear. Visualized mastoid air cells were clear.       No acute intracranial bleed or focal mass effect.   Moderate volume loss.   Moderate chronic white matter ischemic disease in the deep periventricular regions.   Signed by: Ej Pena 10/19/2023 3:48 PM Dictation workstation:   [id number]   Results for orders placed or performed during the hospital encounter of 10/19/23 (from the past 24 hour(s))   BLOOD GAS VENOUS FULL PANEL   Result Value Ref Range    POCT pH, Venous 7.38 7.33 - 7.43 pH    POCT pCO2, Venous 63 (H) 41 - 51 mm Hg    POCT pO2, Venous 26 (L) 35 - 45 mm Hg    POCT SO2, Venous 30 (L) 45 - 75 %    POCT Oxy Hemoglobin, Venous 29.4 (L) 45.0 - 75.0 %     POCT Hematocrit Calculated, Venous 29.0 (L) 36.0 - 46.0 %    POCT Sodium, Venous 147 (H) 136 - 145 mmol/L    POCT Potassium, Venous 2.5 (LL) 3.5 - 5.3 mmol/L    POCT Chloride, Venous 109 (H) 98 - 107 mmol/L    POCT Ionized Calicum, Venous 0.99 (L) 1.10 - 1.33 mmol/L    POCT Glucose, Venous 93 74 - 99 mg/dL    POCT Lactate, Venous 2.4 (H) 0.4 - 2.0 mmol/L    POCT Base Excess, Venous 10.5 (H) -2.0 - 3.0 mmol/L    POCT HCO3 Calculated, Venous 37.3 (H) 22.0 - 26.0 mmol/L    POCT Hemoglobin, Venous 9.5 (L) 12.0 - 16.0 g/dL    POCT Anion Gap, Venous 3.0 (L) 10.0 - 25.0 mmol/L    Patient Temperature      FiO2 100 %    Critical Called By ROXANA     Critical Called To DR MCKEON     Critical Call Time 1500.0000     Critical Read Back Y    POCT GLUCOSE   Result Value Ref Range    POCT Glucose 47 (L) 74 - 99 mg/dL   CBC and Auto Differential   Result Value Ref Range    WBC 6.8 4.4 - 11.3 x10*3/uL    nRBC 0.0 0.0 - 0.0 /100 WBCs    RBC 4.68 4.00 - 5.20 x10*6/uL    Hemoglobin 10.8 (L) 12.0 - 16.0 g/dL    Hematocrit 34.4 (L) 36.0 - 46.0 %    MCV 74 (L) 80 - 100 fL    MCH 23.1 (L) 26.0 - 34.0 pg    MCHC 31.4 (L) 32.0 - 36.0 g/dL    RDW 15.9 (H) 11.5 - 14.5 %    Platelets 207 150 - 450 x10*3/uL    MPV 10.5 7.5 - 11.5 fL    Neutrophils % 29.5 40.0 - 80.0 %    Immature Granulocytes %, Automated 0.1 0.0 - 0.9 %    Lymphocytes % 65.4 13.0 - 44.0 %    Monocytes % 4.8 2.0 - 10.0 %    Eosinophils % 0.1 0.0 - 6.0 %    Basophils % 0.1 0.0 - 2.0 %    Neutrophils Absolute 2.00 1.60 - 5.50 x10*3/uL    Immature Granulocytes Absolute, Automated 0.01 0.00 - 0.50 x10*3/uL    Lymphocytes Absolute 4.46 (H) 0.80 - 3.00 x10*3/uL    Monocytes Absolute 0.33 0.05 - 0.80 x10*3/uL    Eosinophils Absolute 0.01 0.00 - 0.40 x10*3/uL    Basophils Absolute 0.01 0.00 - 0.10 x10*3/uL   Comprehensive Metabolic Panel   Result Value Ref Range    Glucose 98 74 - 99 mg/dL    Sodium 150 (H) 136 - 145 mmol/L    Potassium 2.7 (LL) 3.5 - 5.3 mmol/L    Chloride 107 98 - 107  mmol/L    Bicarbonate 36 (H) 21 - 32 mmol/L    Anion Gap 10 10 - 20 mmol/L    Urea Nitrogen 25 (H) 6 - 23 mg/dL    Creatinine 0.80 0.50 - 1.05 mg/dL    eGFR 68 >60 mL/min/1.73m*2    Calcium 7.5 (L) 8.6 - 10.3 mg/dL    Albumin 2.4 (L) 3.4 - 5.0 g/dL    Alkaline Phosphatase 40 33 - 136 U/L    Total Protein 4.9 (L) 6.4 - 8.2 g/dL    AST 18 9 - 39 U/L    Bilirubin, Total 0.3 0.0 - 1.2 mg/dL    ALT 7 7 - 45 U/L   Lactate   Result Value Ref Range    Lactate 2.5 (H) 0.4 - 2.0 mmol/L   Blood Culture    Specimen: Peripheral Venipuncture; Blood culture   Result Value Ref Range    Blood Culture Loaded on Instrument - Culture in progress    Troponin I, High Sensitivity   Result Value Ref Range    Troponin I, High Sensitivity 332 (HH) 0 - 13 ng/L   Blood Culture    Specimen: Peripheral Venipuncture; Blood culture   Result Value Ref Range    Blood Culture Loaded on Instrument - Culture in progress    POCT GLUCOSE   Result Value Ref Range    POCT Glucose 20 (L) 74 - 99 mg/dL   POCT GLUCOSE   Result Value Ref Range    POCT Glucose 12 (L) 74 - 99 mg/dL   POCT GLUCOSE   Result Value Ref Range    POCT Glucose 156 (H) 74 - 99 mg/dL   Lactate   Result Value Ref Range    Lactate 2.4 (H) 0.4 - 2.0 mmol/L   Troponin I, High Sensitivity   Result Value Ref Range    Troponin I, High Sensitivity 346 (HH) 0 - 13 ng/L   Urinalysis with Reflex Microscopic and Culture   Result Value Ref Range    Color, Urine Straw Straw, Yellow    Appearance, Urine Clear Clear    Specific Gravity, Urine 1.005 1.005 - 1.035    pH, Urine 7.0 5.0, 5.5, 6.0, 6.5, 7.0, 7.5, 8.0    Protein, Urine NEGATIVE NEGATIVE mg/dL    Glucose, Urine >=500 (3+) (A) NEGATIVE mg/dL    Blood, Urine SMALL (1+) (A) NEGATIVE    Ketones, Urine NEGATIVE NEGATIVE mg/dL    Bilirubin, Urine NEGATIVE NEGATIVE    Urobilinogen, Urine <2.0 <2.0 mg/dL    Nitrite, Urine NEGATIVE NEGATIVE    Leukocyte Esterase, Urine NEGATIVE NEGATIVE   Extra Urine Gray Tube   Result Value Ref Range    Extra Tube  Hold for add-ons.    Urinalysis Microscopic   Result Value Ref Range    WBC, Urine 1-5 1-5, NONE /HPF    RBC, Urine 1-2 NONE, 1-2, 3-5 /HPF   BLOOD GAS ARTERIAL FULL PANEL   Result Value Ref Range    POCT pH, Arterial 7.41 7.38 - 7.42 pH    POCT pCO2, Arterial 51 (H) 38 - 42 mm Hg    POCT pO2, Arterial 224 (H) 85 - 95 mm Hg    POCT SO2, Arterial 98 94 - 100 %    POCT Oxy Hemoglobin, Arterial 97.9 94.0 - 98.0 %    POCT Hematocrit Calculated, Arterial 32.0 (L) 36.0 - 46.0 %    POCT Sodium, Arterial 148 (H) 136 - 145 mmol/L    POCT Potassium, Arterial 1.7 (LL) 3.5 - 5.3 mmol/L    POCT Chloride, Arterial 113 (H) 98 - 107 mmol/L    POCT Ionized Calcium, Arterial 1.03 (L) 1.10 - 1.33 mmol/L    POCT Glucose, Arterial 350 (H) 74 - 99 mg/dL    POCT Lactate, Arterial 1.2 0.4 - 2.0 mmol/L    POCT Base Excess, Arterial 6.6 (H) -2.0 - 3.0 mmol/L    POCT HCO3 Calculated, Arterial 32.3 (H) 22.0 - 26.0 mmol/L    POCT Hemoglobin, Arterial 10.6 (L) 12.0 - 16.0 g/dL    POCT Anion Gap, Arterial 4 (L) 10 - 25 mmo/L    Patient Temperature      FiO2 100 %    Critical Called By ROXANA     Critical Called To DR MCKEON     Critical Call Time 1727.0000     Critical Read Back Y    POCT GLUCOSE   Result Value Ref Range    POCT Glucose 88 74 - 99 mg/dL   Blood Gas Arterial Full Panel   Result Value Ref Range    POCT pH, Arterial 7.45 (H) 7.38 - 7.42 pH    POCT pCO2, Arterial 49 (H) 38 - 42 mm Hg    POCT pO2, Arterial 255 (H) 85 - 95 mm Hg    POCT SO2, Arterial 98 94 - 100 %    POCT Oxy Hemoglobin, Arterial 97.6 94.0 - 98.0 %    POCT Hematocrit Calculated, Arterial 38.0 36.0 - 46.0 %    POCT Sodium, Arterial 146 (H) 136 - 145 mmol/L    POCT Potassium, Arterial 2.2 (LL) 3.5 - 5.3 mmol/L    POCT Chloride, Arterial 111 (H) 98 - 107 mmol/L    POCT Ionized Calcium, Arterial 1.04 (L) 1.10 - 1.33 mmol/L    POCT Glucose, Arterial 116 (H) 74 - 99 mg/dL    POCT Lactate, Arterial 1.6 0.4 - 2.0 mmol/L    POCT Base Excess, Arterial 8.7 (H) -2.0 - 3.0 mmol/L     POCT HCO3 Calculated, Arterial 34.1 (H) 22.0 - 26.0 mmol/L    POCT Hemoglobin, Arterial 12.8 12.0 - 16.0 g/dL    POCT Anion Gap, Arterial 3 (L) 10 - 25 mmo/L    Patient Temperature      FiO2 21 %    Apparatus CANNULA     Flow 4.0 LPM    Critical Called By GRANT WEAVER RRT     Critical Called To DR. DICKEY     Critical Call Time 305.0000     Critical Read Back Y           Assessment/Plan   Principal Problem:    Unresponsive State  Active Problems:    Idiopathic hypotension    Hypokalemia    Hypoglycemia      Patient presents in an unresponsive state with gradual improvement however still markedly diminished from baseline.  Patient will respond to verbal cues by orienting to sound but does not open her eyes or engage this physician.  Family reports however that when left alone, patient has been opening her eyes more often and was able to eat a candy bar without difficulty.    Etiology of patient's mental status changes unclear.  Initial work-up has been unremarkable.  Neurology service evaluated patient in the emergency department and consultation has been placed for further assessment.  Appreciate their recommendations and management    Hospice care as well as consult with [facility] has been discontinued.  As stated previously, family has elected to pursue DNAR DNI CODE STATUS with added limitations.  They have stated that they would not want invasive testing or procedures performed.    Therefore at this time we will proceed with neurology consultation as described.  Patient found to be profoundly hypokalemic and will be replaced slowly.  To achieve the desired goal of 3.5 or greater, anticipate patient will require in excess of 120 mEq of oral potassium or IV potassium combination.  This will require slow replacement.  Recommend EKGs be obtained every shift for interval monitoring.  Initial potassium replacement order has been placed.  Obtain repeat level with a.m. labs    Hypoglycemia protocol has been  ordered    Remainder of home medications to be reviewed and resumed as indicated    Consider palliative care consult given patient's age, comorbidities, and initial acuity of her presentation.  Will defer at this time pending further discussion with family.       I spent >75 minutes in the professional and overall care of this patient.      Kermit Cali, DO

## 2023-10-20 NOTE — NURSING NOTE
Patient was admitted from the ER with plan for hospice consult.  The patient became more responsive through out this shift.  She was cleared by ST for a diet and tolerated approx half of her dinner.  She has  scheduled for 10am tomorrow to be discharged home with HOWR following.  Patient's family at bedside and updated with the plan.  Bilat heels elevated on pillows.  JAYDEN Lassiter RN

## 2023-10-20 NOTE — CONSULTS
Reason For Consult  Patient unresponsive. Family requesting hospice referral.    History Of Present Illness  Sheryl Shin is a 89 y.o. female presenting with unresponsiveness.     Past Medical History  She has a past medical history of Body mass index (BMI) 20.0-20.9, adult (11/03/2021), Body mass index (BMI) 22.0-22.9, adult, Body mass index (BMI) 24.0-24.9, adult, Personal history of other infectious and parasitic diseases (08/23/2022), Syncope and collapse (10/18/2022), and Unspecified otitis externa, unspecified ear (04/14/2021).    Surgical History  She has a past surgical history that includes Other surgical history (10/31/2021) and Other surgical history (10/31/2021).    Last Recorded Vitals  Blood pressure 87/57, pulse 54, temperature 35.3 °C (95.5 °F), resp. rate 12, weight 46.2 kg (101 lb 13.6 oz), SpO2 100 %.     Assessment/Plan     Palliative care spoke with patient's family at bedside in ED. They state that hospice was on board last night and they were to admit to [facility] while hospice helped get equipment ordered for transfer home with family when equipment obtained. However, a change in condition led family to cancel hospice plan. Shortly after cancelling plan, patient returned to unresponsive and the family again decided to pursue hospice. They would ideally like to take her straight home from ED (where patient is still located despite technically being admission status). Referral sent to [facility] at this time to continue what they started last night and asked to expedite response due to patient's acuity and family's desire to transfer to home. Will update when [facility] updates.    Update 1628:  [facility] RN to meet with patient and family at 5pm and discuss and coordinate discharge to home with hospice support. For discharge when coordination complete.    Ely Raymond, DAYANA

## 2023-10-20 NOTE — PROGRESS NOTES
Sheryl Shin is a 89 y.o. female on day 0 of admission presenting with Hospice care.    Principal Problem:    Hospice care  Active Problems:    Idiopathic hypotension    Unresponsive state    Hypoglycemia    Altered mental status, unspecified altered mental status type      Assessment/Plan         - Carried out discussion independently with family at bedside, agree that even with improvement of mentation GOC are still for comfort care given her age and comorbidities.  - Neuro recs appreciated: Given reversion of GOC, will defer further evaluation. However, reasonable to continue Keppra.  - Code status changed to DNRCC. [facility] met with family this evening, planning for transportation tomorrow with home hospice.  - SLP was consulted with concern for aspiration, recommended thin liquids and minced/wet food 5.  - Ok to continue pharmacotherapy but would not enact further invasive diagnostic testing/treatment.  - AM BMP to evaluate for Na, K--pt may benefit from increased dosing of oral potassium.       Subjective   Pt seen/examined, family at bedside. State pt has sustained more alertness since this morning, but does not change their GOC as they would still like to focus on comfort and minimize invasive testing. They added that she seems to prefer male caregivers, and pt said something I was unable to comprehend but family reminded her to be a little less crass outside the home. I stated any sign of attitude that was familiar to them was a good sign.       Objective     Last Recorded Vitals  /72   Pulse 61   Temp 36.1 °C (97 °F)   Resp 18   Wt 46.2 kg (101 lb 13.6 oz)   SpO2 93%   Intake/Output last 3 Shifts:    Intake/Output Summary (Last 24 hours) at 10/20/2023 1557  Last data filed at 10/20/2023 0828  Gross per 24 hour   Intake 266.67 ml   Output 500 ml   Net -233.33 ml       Admission Weight  Weight: 46.2 kg (101 lb 13.6 oz) (10/19/23 1456)    Daily Weight  10/19/23 : 46.2 kg (101 lb 13.6  oz)    Image Results  XR chest 1 view  Narrative: Interpreted By:  Ej Pena,   STUDY:  XR CHEST 1 VIEW;  10/19/2023 4:14 pm      INDICATION:  Signs/Symptoms:Respiratory arrest.      COMPARISON:  Chest x-rays, most recent from 09/01/2022.      ACCESSION NUMBER(S):  [id number]      ORDERING CLINICIAN:  CONSTANTINO MCKEON      TECHNIQUE:  Single AP supine portable view of the chest was obtained.      FINDINGS:  MEDIASTINUM/ LUNGS/ MADHU:  Mild cardiomegaly, currently without vascular congestion, or pleural  effusion. No abnormal opacity in either lung worrisome for tumor or  pneumonia. No pneumothorax.  No tracheal deviation.  No abnormal hilar fullness or gross mass on either side.      BONES:  No lytic or blastic destructive bone lesion.      UPPER ABDOMEN:  Grossly intact.      Impression: Mild cardiomegaly.  Currently without radiographic evidence of CHF or  pneumonia.      Signed by: Ej Pena 10/19/2023 4:28 PM  Dictation workstation:   [id number]  CT brain attack head wo IV contrast  Narrative: Interpreted By:  Ej Pena,   STUDY:  CT BRAIN ATTACK HEAD WO IV CONTRAST;  10/19/2023 3:41 pm      INDICATION:  Signs/Symptoms:Altered mental status, L side weakness.      COMPARISON:  Previous exam is from 09/01/2022..      ACCESSION NUMBER(S):  [id number]      ORDERING CLINICIAN:  CONSTANTINO MCKEON      TECHNIQUE:  Routine axial images were obtained from the skull base through the  vertex.  Sagittal and coronal reconstruction images were generated.  Brain, subdural, and bone windows were reviewed.      FINDINGS:  INTRACRANIAL:  Moderate prominence of ventricles and sulci. There is moderate patchy  hypodensity throughout the deep periventricular white matter.  Physiologic calcifications in the bilateral basal ganglia. No acute  intracranial bleed, midline shift, or focal mass effect. No  destructive bone lesion. No depressed skull fracture. Skullbase  arterial calcifications in the carotid siphons and  vertebral arteries.      EXTRACRANIAL:  Visualized paranasal sinuses were clear.  Visualized mastoid air cells were clear.      Impression: No acute intracranial bleed or focal mass effect.      Moderate volume loss.      Moderate chronic white matter ischemic disease in the deep  periventricular regions.      Signed by: Ej Pena 10/19/2023 3:48 PM  Dictation workstation:   [id number]      Physical Exam  Vitals reviewed.   Constitutional:       General: She is not in acute distress.     Appearance: She is ill-appearing.   HENT:      Head: Normocephalic and atraumatic.      Right Ear: External ear normal.      Left Ear: External ear normal.      Nose: Nose normal.   Eyes:      Extraocular Movements: Extraocular movements intact.   Cardiovascular:      Rate and Rhythm: Normal rate and regular rhythm.   Pulmonary:      Effort: Pulmonary effort is normal. No respiratory distress.      Breath sounds: Normal breath sounds.   Abdominal:      General: There is no distension.      Palpations: Abdomen is soft.      Tenderness: There is no abdominal tenderness.   Musculoskeletal:         General: No signs of injury. Normal range of motion.      Cervical back: Normal range of motion.   Skin:     General: Skin is warm and dry.   Neurological:      General: No focal deficit present.      Mental Status: She is alert. Mental status is at baseline. She is disoriented.   Psychiatric:         Mood and Affect: Mood normal.         Behavior: Behavior normal.         Relevant Results     PRN medications: acetaminophen **OR** acetaminophen **OR** acetaminophen, acetaminophen **OR** acetaminophen **OR** acetaminophen, gentamicin, melatonin, metoclopramide **OR** metoclopramide, ondansetron ODT **OR** ondansetron, polyethylene glycol  aspirin, 81 mg, oral, Daily  cholecalciferol, 5,000 Units, oral, Daily  fludrocortisone, 0.1 mg, oral, Daily  latanoprost, 1 drop, Both Eyes, Nightly  memantine, 10 mg, oral, BID  midodrine, 2.5 mg,  oral, TID  pantoprazole, 40 mg, oral, Daily before breakfast   Or  pantoprazole, 40 mg, intravenous, Daily before breakfast  potassium chloride, 20 mEq, oral, Daily             Results from last 7 days   Lab Units 10/19/23  1540   WBC AUTO x10*3/uL 6.8   RBC AUTO x10*6/uL 4.68   HEMOGLOBIN g/dL 10.8*   HEMATOCRIT % 34.4*     Results from last 7 days   Lab Units 10/20/23  1009 10/19/23  1540   SODIUM mmol/L 146* 150*   POTASSIUM mmol/L 2.6* 2.7*   CHLORIDE mmol/L 108* 107   CO2 mmol/L 30 36*   BUN mg/dL 21 25*   CREATININE mg/dL 0.66 0.80   CALCIUM mg/dL 7.4* 7.5*   BILIRUBIN TOTAL mg/dL 0.3 0.3   ALT U/L 44 7   AST U/L 54* 18     Results from last 7 days   Lab Units 10/19/23  1717   COLOR U  Straw   APPEARANCE U  Clear   PH U  7.0   SPEC GRAV UR  1.005   PROTEIN U mg/dL NEGATIVE   BLOOD UR  SMALL (1+)*   NITRITE U  NEGATIVE   WBC UR /HPF 1-5     Luis Hart DO  /[facility] Medicine

## 2023-10-20 NOTE — PROGRESS NOTES
"Speech-Language Pathology    Inpatient Speech-Language Pathology Clinical Swallow Evaluation    Patient Name: Sheryl Shin  MRN: [id number]  Today's Date: 10/20/2023   Time Calculation  Start Time: 1445  Stop Time: 1503  Time Calculation (min): 18 min         Current Problem:   1. Altered mental status, unspecified altered mental status type  Referral to Hospice      2. Shock (CMS/HCC)        3. Hypokalemia        4. Elevated troponin        5. Unresponsive state  Referral to Hospice      6. Hospice care  Referral to Hospice      7. Cognitive impairment  Referral to Hospice            Recommendations:  Risk for Aspiration: Yes  Solid Diet Recommendations : Minced & moist/ground (IDDSI Level 5)  Liquid Diet Recommendations: Thin (IDDSI Level 0) (Single sips)  Compensatory Swallowing Strategies:   Upright 90 degrees as possible for all oral intake,   One to one assist with meals,   Single sips,   Eat/feed slowly  Medication Administration Recommendations: With Pureed      Assessment:  Patient presents with oral dysphagia and concern for possible pharyngeal dysphagia upon completion of clinical swallow evaluation at bedside this date. Examination of oral mechanism was somewhat limited due to cognitive status. Patient did not \"pass\" Moira Swallow Protocol due to stopping before completion and demonstration of immediate overt wet cough. Patient demonstrated prolonged oral manipulation and mild bilateral oral residue with puree. Patient showed coughing/gargling after sequential sips of thin liquids and coughing with puree.     Per this assessment, patient is at an elevated risk for aspiration, however, given patient does not have a hx or concern for pneumonia, is resuming baseline diet, with plan to transition to hospice care. Skilled ST services are not warranted at this time; please re-consult as needed if new concerns arise.      Plan:  SLP - OK to Discharge: Yes      Subjective   Patient was alert but confused " consistent with cognitive status. Patient's caregiver reported she feeds patient at home and purees/mashes her food. Patient typically drinks from a straw.       General Visit Information:  Patient Class: Inpatient  Living Environment: Home, Live with __ (Caregiver)  Ordering Physician: Luis Hart DO  Reason for Referral: Concern for oropharyngeal dysphagia  Past Medical History Relevant to Rehab: Dementia, HTN, CXR 10/19:IMPRESSION:  Mild cardiomegaly.  Currently without radiographic evidence of CHF or  pneumonia.  Prior Level of Function: Decreased function  Patient Seen During This Visit: Yes  Prior to Session Communication: Bedside nurse  Reviewed Procedures and Risks: Yes  Date of Onset: 10/19/23  [employer] Diet: Caregiver reported patient drinks thin liquids via straw and eats soft food that is blended in a  or mashed with a fork at home.  Current Diet : NPO  Dysphagia Diagnosis: Other (Comment) (Suspected oropharyngeal dysphagia)        Pain:   Pain Assessment: PAINAD (Pain Assessment in Advanced Dementia Scale)  Pain Score: 0 - No pain       Oral/Motor Assessment:  Oral Hygiene: Adequate  Dentition: Other (Comment) (Natural lower dentition. Caregiver reported patient does not wear dentures)  Oral Motor:  (Limited due to patient's inability to follow commands)  Lingual ROM: Within Functional Limits  Lingual Symmetry: Within Functional Limits  Vocal Quality: Within Functional Limits      Consistencies Trialed:   Consistencies Trialed: Yes  Consistencies Trialed: Thin (IDDSI Level 0) - Straw, Pureed/extremely thick (IDDSI Level 4)         Clinical Observations:   Patient Positioning: Upright in Bed  Management of Oral Secretions: Adequate  Was The 3 oz Swallow Protocol Completed: Yes  Signs/Symptoms of Aspiration: Cough, Throat Clearing, Wet Vocal Quality  Overt Signs or Symptoms of Aspiration: Thin (IDDSI Level 0) - Straw, Pureed/Extremely Thick (IDDSI Level 4)  Prolonged Oral Manipulation:  Thin (IDDSI Level 0) - Straw, Pureed/Extremity Thick (IDDSI Level 4)  Multiple Swallows: Pureed/Extremely Thick (IDDSI Level 4)  Immediate Cough: Thin (IDDSI Level 0) - Straw, Pureed/Extremely Thick (IDDSI Level 4)  Delayed Cough:  (After PO trials ceased)  Immediate Throat Clear: Thin (IDDSI Level 0) - Straw (After Lakshmi swallow protocol)  Wet Vocal Quality: Thin (IDDSI Level 0) - Straw        Education:  Learner patient;    Barriers to Learning acuteness of illness barrier; cognitive limitations barrier   Method demonstration; verbal   Education - Topic ST provided patient education regarding role of ST, purpose of assessment, clinical impressions, goals of treatment, and plan of care. Patient did not show comprehension,consistent with cognitive status however, family/caregivers did. ST further coordinated with RN regarding recommendations and precautions per this assessment, with RN verbalizing understanding.

## 2023-10-20 NOTE — NURSING NOTE
The writer notified Dr. Owens that the patient family wanted to speak to him. Per Dr. Owens's note yesterday if the patient was still unresponsive he wanted to recommend MRI and vEEG. The family wants to place the patient back on hospice. If she goes back on hospice then she will not be able to get those tests completed.     Dr. Owens came to round on the patient. He stated no neuro testing at this time and to transition patient to hospice care. Palliative care came to round on the patient.     1200-The patient was transferred to . The writer called report to YOMAIRA Barrera.

## 2023-10-20 NOTE — PROGRESS NOTES
Sheryl Shin is a 89 y.o. female on day 0 of admission presenting with Hospice care.      Subjective   Reviewed events from overnight       Objective     Last Recorded Vitals  Blood pressure 87/57, pulse 54, temperature 35.3 °C (95.5 °F), resp. rate 12, weight 46.2 kg (101 lb 13.6 oz), SpO2 100 %.    Physical Exam  Neurological Exam    Gen: NAD  Neuro:  --HIF: Does not open eyes to verbal or painful stimulation; somnolent  --CN: PERRLA, + corneal reflex, + dolls eyes, no visible facial asymmetry  --Motor/Sensory: no movement or grimace to painful stimulation              Lluvia Coma Scale  Best Eye Response: To pain  Best Verbal Response: None  Best Motor Response: Extension to pain  Lluvia Coma Scale Score: 5                             Assessment/Plan    Encephalopathy  - I had a long discussion today with the patient's daughter and grand daughter  - we discussed the role of an EEG and MRI to evaluate etiology of encephalopathy  - EEG would be to look for interictal discharges  - goal of MRI Brain would be to evaluate for a stroke   - after a long discussion, we decided together to hold off on further diagnostic testing  - I am okay with continuing Keppra for seizure protection  - we discussed role of hospice; given her age and underlying neurodegenerative disorder, hospice is a very reasonable next step    Eran Owens MD  [facility]  Department of Neurology      Eran Owens MD

## 2023-10-21 NOTE — CARE PLAN
The patient's goals for the shift include      The clinical goals for the shift include: Pt will remain comfortable throughout shift.     Over the shift, the patient did not make progress toward the following goals. Barriers to progression include ***. Recommendations to address these barriers include ***.

## 2023-10-21 NOTE — CARE PLAN
The patient's goals for the shift include      The clinical goals for the shift include Pt will remain comfortable throughout shift      Pt sent home with [facility].  [facility] by [facility].  Pt was cleaned and dressed along with Ivs removed.  Discharge discussed with Jacy Michelle.  Potassium prescription sent to pharmacy per MD.

## 2023-10-21 NOTE — NURSING NOTE
Patient is a hospice patient planis for discharge home with hospice  time 10 am 10/21/2023. Patient is total care, Q2 turn and reposition. Family at bedside. Safety maintained

## 2023-10-21 NOTE — HOSPITAL COURSE
Sheryl Shin is a 89 y.o. female presenting with her family members due to an unresponsive episode.  Patient was reportedly at her cognitive baseline described as alert and oriented x2-3, confused with baseline dementia, earlier in the day when she was evaluated at the [facility] for routine checkup.  Patient was initially thought to be asleep however after several attempts it was noted the patient could not be aroused.  EMS was called and patient was brought to the emergency department where she was found to be hypotensive with blood pressures in the 60s and tachycardic into the 120s.  She was profoundly hypoglycemic and administered IV dextrose.  Intubation was deferred with family present at bedside.  Following pressure bag of fluid x2 L, as well as 400 mics of phenylephrine, blood pressure did improve and stabilize.  Patient was placed on nonrebreather and maintaining good oxygen saturation.  Case was discussed with neurology service as despite their resuscitative efforts and reassuring vital signs, patient remained unresponsive.  CT scan was obtained due to right eye deviation and on flexion.  Neurology service consulted and able to evaluate patient in the emergency department.  Recommended giving 1 g of Keppra in the event that this represented seizure activity.  Patient became further hypotensive and goals of care discussion with family will transition to palliative measures.  Family at 1 point had elected to pursue DNR comfort care status and hospice was consulted.  Hospice nurse was present at bedside and consents were signed with hospice [facility].  However shortly thereafter, patient began to wake up, blood pressure remained stable, and family then elected to withdrawal their request for hospice care and patient's CODE STATUS was updated to DNAR DNI with limitations.  Family stated they would not want invasive measures such as central line, intubation, invasive procedures.  They  understand that this CODE STATUS carries with it a great degree of subjectivity and they will be available for clarification if needed especially during the initial aspect of this hospital admission.  Patient reassessed x2 in the emergency department.  Remains minimally responsive but hemodynamically stable.  Family remains at bedside and in agreement with plan of care.  To admit as a DNAR DNI with limitations.  Will be evaluated by neurology service.  Further plan of care pending work-up.     The following morning, her mentation improved and I met with pt/family. They still elected to pursue hospice given her cognitive decline and co morbidities, which I felt was appropriate. Palliative was consulted, [facility] met with family in the evening and plan for transportation to home hospice on 10/22. Neurology did elect to keep her on Keppra but did not initiate further testing. SLP was consulted to evaluate her for dysphagia--recommended minced/wet food and thin liquids. Potassium was repleted, and as we elect to continue pharmacotherapy she could benefit from an increase of her daily K. Otherwise placed in discharge status on 10/20.

## 2023-10-28 NOTE — DOCUMENTATION CLARIFICATION NOTE
"    PATIENT:               JOSE RAUL GONZALES  ACCT #:                  [id number]  MRN:                       [id number]  :                       4/10/1929  ADMIT DATE:       10/19/2023 2:44 PM  DISCH DATE:        10/21/2023 11:12 AM  RESPONDING PROVIDER #:        [id number]          PROVIDER RESPONSE TEXT:    Acute Hypoxemic and Hypercapnic Respiratory Failure    CDI QUERY TEXT:    [facility]_Respiratory Failure Acute        Instruction:    Based on your assessment of the patient and the clinical information, please provide the requested documentation by clicking on the appropriate radio button and enter any additional information if prompted.    Question: Is there a diagnosis indicative of the clinical information    When answering this query, please exercise your independent professional judgment. The fact that a question is being asked, does not imply that any particular answer is desired or expected.    The patient's clinical indicators include:  Clinical Information: 89 year old female presented 10/19 with encephalopathy, tachycardia, shock-requiring vasopressor. AMS and BP improved, discharged 10/21.    Clinical Indicators: ED Note 10/19 per Dr. Arrington states: \"She is having slow respiratory issues and is being bagged by EMS on arrival....No stridor, snoring, or gurgling on auscultation.....She is easy to bag and saturating well with the bag-valve-mask. ....She was able to be transitioned to nonrebreather with respiration rate 12-14 with good oxygen saturation.\"    CXR 10/19: Mild cardiomegaly.    10/19/23 1700: SpO2 93% on non-rebreather mask.    Treatment: Supplemental O2 with bag-valve-mask, nonrebreather mask, NC 3L, CXR.    Risk Factors: AMS, encephalopathy, shock, tachycardia.  Options provided:  -- Acute Hypoxemic Respiratory Failure  -- Acute Hypercapnic Respiratory Failure  -- Acute Hypoxemic and Hypercapnic Respiratory Failure  -- No acute respiratory failure  -- Other - I will add my own diagnosis  -- Refer to " Clinical Documentation Reviewer    Query created by: Rosa Salgado on 10/24/2023 11:04 AM      Electronically signed by:  CHANEL SEGOVIA DO 10/27/2023 9:53 PM

## 2023-10-28 NOTE — DOCUMENTATION CLARIFICATION NOTE
"    PATIENT:               JOSE RAUL GONZALES  ACCT #:                  [id number]  MRN:                       [id number]  :                       4/10/1929  ADMIT DATE:       10/19/2023 2:44 PM  DISCH DATE:        10/21/2023 11:12 AM  RESPONDING PROVIDER #:        [id number]          PROVIDER RESPONSE TEXT:    Type II MI    CDI QUERY TEXT:    [facility]_MI        Instruction:    Based on your assessment of the patient and the clinical information, please provide the requested documentation by clicking on the appropriate radio button and enter any additional information if prompted.    Question: Is there a diagnosis indicative of the patient elevated Troponins and symptoms    When answering this query, please exercise your independent professional judgment. The fact that a question is being asked, does not imply that any particular answer is desired or expected.    The patient's clinical indicators include:  Clinical Information: 89 year old female admitted 10/19 with encephalopathy, tachycardia, shock-requiring vasopressor. Pt. delivered supplemental O2 with bag-valve-mask followed by 15L non-rebreather mask and was able to finally transition to NC 3L. Pt. discharged 10/21.    Clinical Indicators: Troponins on day of admit 10/19: [id number].    ED Note 10/19 per Dr. Arrington states: \"Troponin returned elevated in the 300s.  We discussed this with Dr. Woo, cardiology  on call.  In context of her hypotension and tachycardia, he believes that it is likely secondary to demand/supply mismatch and not occlusive disease.\"    EKG 10/19: AFib, Nonspecific ST and T wave abnormality.    Treatment: Trend troponins, EKG, cardiology notified in ED of elevated troponins.    Risk Factors: Shock, encephalopathy, tachycardia.  Options provided:  -- Type II MI  -- Non-ischemic myocardial injury  -- Troponin elevation due to other, Please specify additional information below  -- Other - I will add my own diagnosis  -- Refer to Clinical " Documentation Reviewer    Query created by: Rosa Salgado on 10/24/2023 10:47 AM      Electronically signed by:  CHANEL SEGOVIA DO 10/27/2023 9:53 PM

## 2024-04-09 ENCOUNTER — APPOINTMENT (OUTPATIENT)
Dept: PRIMARY CARE | Facility: CLINIC | Age: 89
End: 2024-04-09
Payer: MEDICARE